# Patient Record
Sex: FEMALE | Race: WHITE | NOT HISPANIC OR LATINO | URBAN - METROPOLITAN AREA
[De-identification: names, ages, dates, MRNs, and addresses within clinical notes are randomized per-mention and may not be internally consistent; named-entity substitution may affect disease eponyms.]

---

## 2021-06-10 ENCOUNTER — HOSPITAL ENCOUNTER (OUTPATIENT)
Facility: HOSPITAL | Age: 26
End: 2021-06-10
Attending: OBSTETRICS & GYNECOLOGY | Admitting: OBSTETRICS & GYNECOLOGY

## 2021-06-10 ENCOUNTER — LAB (OUTPATIENT)
Dept: LAB | Facility: HOSPITAL | Age: 26
End: 2021-06-10

## 2021-06-10 LAB — GLUCOSE BLDC GLUCOMTR-MCNC: 335 MG/DL (ref 70–130)

## 2021-06-10 PROCEDURE — 82962 GLUCOSE BLOOD TEST: CPT

## 2023-06-13 ENCOUNTER — INITIAL PRENATAL (OUTPATIENT)
Dept: OBSTETRICS AND GYNECOLOGY | Facility: CLINIC | Age: 28
End: 2023-06-13
Payer: MEDICAID

## 2023-06-13 VITALS
DIASTOLIC BLOOD PRESSURE: 68 MMHG | WEIGHT: 125 LBS | BODY MASS INDEX: 19.62 KG/M2 | SYSTOLIC BLOOD PRESSURE: 122 MMHG | HEIGHT: 67 IN

## 2023-06-13 DIAGNOSIS — O36.80X0 ENCOUNTER TO DETERMINE FETAL VIABILITY OF PREGNANCY, SINGLE OR UNSPECIFIED FETUS: Primary | ICD-10-CM

## 2023-06-13 DIAGNOSIS — Z12.4 SCREENING FOR MALIGNANT NEOPLASM OF CERVIX: ICD-10-CM

## 2023-06-13 DIAGNOSIS — O09.30 LATE PRENATAL CARE: ICD-10-CM

## 2023-06-13 PROCEDURE — 99203 OFFICE O/P NEW LOW 30 MIN: CPT | Performed by: MIDWIFE

## 2023-06-13 NOTE — PROGRESS NOTES
"Subjective     Chief Complaint   Patient presents with    Initial Prenatal Visit     No Complaints/concerns  Patient still has nexplanon in left arm, inserted          Iza Mendieta is a 27 y.o. .  No LMP recorded. Patient is pregnant..  She presents to be seen to initiate prenatal care with our practice. She is currently incarcerated due to probation violation but hopes to get released next week. Her first pregnancy was  uncomplicated with . She had a Nexplanon placed  in left arm and it is still there.    The following portions of the patient's history were reviewed and updated as appropriate:vital signs, allergies, current medications, past medical history, past social history, past surgical history and problem list.    Review of Systems -   /68   Ht 170.2 cm (67\")   Wt 56.7 kg (125 lb)   BMI 19.58 kg/m²    Gastrointestinal: denies nausea and vomiting, denies constipation  Genitourinary: denies frequency, urgency, or burning with urination  All other systems were reviewed and are negative    Objective     Physical Exam  Constitutional   The patient is awake, alert, well developed, well nourished and well groomed.   Neck   The neck is supple and the trachea is midline. The thyroid is not enlarged and there are no palpable nodules.   Respiratory  The patient is relaxed and breathes without effort.   Lungs CTAB  Cardiovascular  Normal rate and rhythm is regular without murmur -  Negative LE pitting edema  Gastrointestinal   The abdomen is gravid - soft - non tender.  No umbilical hernia  Genitourinary   - External Genitalia without erythema, lesions, or masses  -Vagina - There is thick yellowish white vaginal discharge.   -Cervix is without cervical motion tenderness   Uterus - S=D, fundus at U  Musculoskeletal  Normal gait, no joint pain or swelling  Extremities  Full ROM. No cyanosis or edema  Nexplanon palpated left upper arm  Psychiatric  The patient is oriented to person, " place, and time. Maintains eye contact     Imaging   OB ultrasound  19 wks, normal anatomy US  US Ob 14 + Weeks Single or First Gestation (06/13/2023 11:05)     Assessment & Plan     ASSESSMENT  IUP at 19w0d  2.   Normal pregnancy  3.   Late entry to care  4.   Nexplanon in place    PLAN  Tests ordered today (option for genetic screening info given if appropriate):   Orders Placed This Encounter   Procedures    OB Panel With HIV     Order Specific Question:   Release to patient     Answer:   Routine Release    Urine Drug Screen - Urine, Clean Catch     Order Specific Question:   Release to patient     Answer:   Routine Release     Medications prescribed today:  No orders of the defined types were placed in this encounter.    Information reviewed:smoking in pregnancy, exercise in pregnancy, nutrition in pregnancy, weight gain in pregnancy, work and travel restrictions during pregnancy, list of OTC medications acceptable in pregnancy, and call coverage groups  Consulted with Dr Sargent regarding Nexplanon removal. She is considering having this replaced postpartum. Therefore, we can do removal and reinsertion postpartum.    Follow up: 4 week(s)         This note was electronically signed.    Suad Valdez CNM  June 13, 2023

## 2023-06-14 LAB
ABO GROUP BLD: ABNORMAL
AMPHETAMINES UR QL SCN: NEGATIVE NG/ML
BARBITURATES UR QL SCN: NEGATIVE NG/ML
BASOPHILS # BLD AUTO: 0 X10E3/UL (ref 0–0.2)
BASOPHILS NFR BLD AUTO: 0 %
BENZODIAZ UR QL SCN: NEGATIVE NG/ML
BLD GP AB SCN SERPL QL: NEGATIVE
BZE UR QL SCN: NEGATIVE NG/ML
CANNABINOIDS UR QL SCN: NEGATIVE NG/ML
CREAT UR-MCNC: 73.6 MG/DL (ref 20–300)
EOSINOPHIL # BLD AUTO: 0.1 X10E3/UL (ref 0–0.4)
EOSINOPHIL NFR BLD AUTO: 1 %
ERYTHROCYTE [DISTWIDTH] IN BLOOD BY AUTOMATED COUNT: 13 % (ref 11.7–15.4)
HBV SURFACE AG SERPL QL IA: NEGATIVE
HCT VFR BLD AUTO: 35.6 % (ref 34–46.6)
HCV IGG SERPL QL IA: NON REACTIVE
HGB BLD-MCNC: 12 G/DL (ref 11.1–15.9)
HIV 1+2 AB+HIV1 P24 AG SERPL QL IA: NON REACTIVE
IMM GRANULOCYTES # BLD AUTO: 0 X10E3/UL (ref 0–0.1)
IMM GRANULOCYTES NFR BLD AUTO: 0 %
LABORATORY COMMENT REPORT: NORMAL
LYMPHOCYTES # BLD AUTO: 1.4 X10E3/UL (ref 0.7–3.1)
LYMPHOCYTES NFR BLD AUTO: 17 %
MCH RBC QN AUTO: 31.9 PG (ref 26.6–33)
MCHC RBC AUTO-ENTMCNC: 33.7 G/DL (ref 31.5–35.7)
MCV RBC AUTO: 95 FL (ref 79–97)
METHADONE UR QL SCN: NEGATIVE NG/ML
MONOCYTES # BLD AUTO: 0.6 X10E3/UL (ref 0.1–0.9)
MONOCYTES NFR BLD AUTO: 7 %
NEUTROPHILS # BLD AUTO: 6.1 X10E3/UL (ref 1.4–7)
NEUTROPHILS NFR BLD AUTO: 75 %
OPIATES UR QL SCN: NEGATIVE NG/ML
OXYCODONE+OXYMORPHONE UR QL SCN: NEGATIVE NG/ML
PCP UR QL: NEGATIVE NG/ML
PH UR: 7.2 [PH] (ref 4.5–8.9)
PLATELET # BLD AUTO: 265 X10E3/UL (ref 150–450)
PROPOXYPH UR QL SCN: NEGATIVE NG/ML
RBC # BLD AUTO: 3.76 X10E6/UL (ref 3.77–5.28)
RH BLD: POSITIVE
RPR SER QL: NON REACTIVE
RUBV IGG SERPL IA-ACNC: 1.59 INDEX
WBC # BLD AUTO: 8.2 X10E3/UL (ref 3.4–10.8)

## 2023-06-15 LAB — REF LAB TEST METHOD: NORMAL

## 2023-07-31 ENCOUNTER — ROUTINE PRENATAL (OUTPATIENT)
Dept: OBSTETRICS AND GYNECOLOGY | Facility: CLINIC | Age: 28
End: 2023-07-31
Payer: MEDICAID

## 2023-07-31 VITALS — BODY MASS INDEX: 21.3 KG/M2 | DIASTOLIC BLOOD PRESSURE: 64 MMHG | SYSTOLIC BLOOD PRESSURE: 118 MMHG | WEIGHT: 136 LBS

## 2023-07-31 DIAGNOSIS — Z13.1 DIABETES MELLITUS SCREENING: ICD-10-CM

## 2023-07-31 DIAGNOSIS — O09.30 LATE PRENATAL CARE: ICD-10-CM

## 2023-07-31 DIAGNOSIS — N76.0 ACUTE VAGINITIS: Primary | ICD-10-CM

## 2023-07-31 DIAGNOSIS — Z34.92 PRENATAL CARE IN SECOND TRIMESTER: Primary | ICD-10-CM

## 2023-07-31 DIAGNOSIS — Z20.2 CHLAMYDIA CONTACT: ICD-10-CM

## 2023-07-31 LAB
BASOPHILS # BLD AUTO: 0.02 10*3/MM3 (ref 0–0.2)
BASOPHILS NFR BLD AUTO: 0.2 % (ref 0–1.5)
EOSINOPHIL # BLD AUTO: 0.13 10*3/MM3 (ref 0–0.4)
EOSINOPHIL NFR BLD AUTO: 1.3 % (ref 0.3–6.2)
ERYTHROCYTE [DISTWIDTH] IN BLOOD BY AUTOMATED COUNT: 11.7 % (ref 12.3–15.4)
GLUCOSE 1H P 50 G GLC PO SERPL-MCNC: 63 MG/DL (ref 65–139)
HCT VFR BLD AUTO: 32 % (ref 34–46.6)
HGB BLD-MCNC: 10.7 G/DL (ref 12–15.9)
IMM GRANULOCYTES # BLD AUTO: 0.03 10*3/MM3 (ref 0–0.05)
IMM GRANULOCYTES NFR BLD AUTO: 0.3 % (ref 0–0.5)
LYMPHOCYTES # BLD AUTO: 1.74 10*3/MM3 (ref 0.7–3.1)
LYMPHOCYTES NFR BLD AUTO: 17.3 % (ref 19.6–45.3)
MCH RBC QN AUTO: 30.8 PG (ref 26.6–33)
MCHC RBC AUTO-ENTMCNC: 33.4 G/DL (ref 31.5–35.7)
MCV RBC AUTO: 92.2 FL (ref 79–97)
MONOCYTES # BLD AUTO: 0.69 10*3/MM3 (ref 0.1–0.9)
MONOCYTES NFR BLD AUTO: 6.9 % (ref 5–12)
NEUTROPHILS # BLD AUTO: 7.45 10*3/MM3 (ref 1.7–7)
NEUTROPHILS NFR BLD AUTO: 74 % (ref 42.7–76)
NRBC BLD AUTO-RTO: 0 /100 WBC (ref 0–0.2)
PLATELET # BLD AUTO: 242 10*3/MM3 (ref 140–450)
RBC # BLD AUTO: 3.47 10*6/MM3 (ref 3.77–5.28)
WBC # BLD AUTO: 10.06 10*3/MM3 (ref 3.4–10.8)

## 2023-07-31 RX ORDER — AZITHROMYCIN 500 MG/1
1000 TABLET, FILM COATED ORAL ONCE
Qty: 2 TABLET | Refills: 0 | Status: SHIPPED | OUTPATIENT
Start: 2023-07-31 | End: 2023-08-01

## 2023-08-01 DIAGNOSIS — Z20.5 EXPOSURE TO HEPATITIS C: Primary | ICD-10-CM

## 2023-08-01 DIAGNOSIS — D50.9 IRON DEFICIENCY ANEMIA DURING PREGNANCY: Primary | ICD-10-CM

## 2023-08-01 DIAGNOSIS — O99.019 IRON DEFICIENCY ANEMIA DURING PREGNANCY: Primary | ICD-10-CM

## 2023-08-01 RX ORDER — FERROUS SULFATE 325(65) MG
325 TABLET ORAL
Qty: 60 TABLET | Refills: 6 | Status: SHIPPED | OUTPATIENT
Start: 2023-08-01

## 2023-08-03 DIAGNOSIS — O23.599 TRICHOMONAL VAGINITIS DURING PREGNANCY, ANTEPARTUM: ICD-10-CM

## 2023-08-03 DIAGNOSIS — O98.819 CHLAMYDIA INFECTION DURING PREGNANCY: Primary | ICD-10-CM

## 2023-08-03 DIAGNOSIS — A74.9 CHLAMYDIA INFECTION DURING PREGNANCY: Primary | ICD-10-CM

## 2023-08-03 DIAGNOSIS — A59.01 TRICHOMONAL VAGINITIS DURING PREGNANCY, ANTEPARTUM: ICD-10-CM

## 2023-08-03 DIAGNOSIS — B37.9 INFECTION DUE TO CANDIDA GLABRATA: ICD-10-CM

## 2023-08-03 LAB
A VAGINAE DNA VAG QL NAA+PROBE: ABNORMAL SCORE
BVAB2 DNA VAG QL NAA+PROBE: ABNORMAL SCORE
C ALBICANS DNA VAG QL NAA+PROBE: NEGATIVE
C GLABRATA DNA VAG QL NAA+PROBE: POSITIVE
C TRACH DNA VAG QL NAA+PROBE: POSITIVE
MEGA1 DNA VAG QL NAA+PROBE: ABNORMAL SCORE
N GONORRHOEA DNA VAG QL NAA+PROBE: NEGATIVE
T VAGINALIS DNA VAG QL NAA+PROBE: POSITIVE

## 2023-08-03 RX ORDER — AZITHROMYCIN 500 MG/1
1000 TABLET, FILM COATED ORAL ONCE
Qty: 2 TABLET | Refills: 0 | Status: SHIPPED | OUTPATIENT
Start: 2023-08-03 | End: 2023-08-04 | Stop reason: SDUPTHER

## 2023-08-03 RX ORDER — METRONIDAZOLE 500 MG/1
2000 TABLET ORAL ONCE
Qty: 4 TABLET | Refills: 0 | Status: SHIPPED | OUTPATIENT
Start: 2023-08-03 | End: 2023-08-04 | Stop reason: SDUPTHER

## 2023-08-04 ENCOUNTER — TELEPHONE (OUTPATIENT)
Dept: OBSTETRICS AND GYNECOLOGY | Facility: CLINIC | Age: 28
End: 2023-08-04

## 2023-08-04 DIAGNOSIS — O23.599 TRICHOMONAL VAGINITIS DURING PREGNANCY, ANTEPARTUM: ICD-10-CM

## 2023-08-04 DIAGNOSIS — B37.9 INFECTION DUE TO CANDIDA GLABRATA: ICD-10-CM

## 2023-08-04 DIAGNOSIS — A74.9 CHLAMYDIA INFECTION DURING PREGNANCY: ICD-10-CM

## 2023-08-04 DIAGNOSIS — O98.819 CHLAMYDIA INFECTION DURING PREGNANCY: ICD-10-CM

## 2023-08-04 DIAGNOSIS — A59.01 TRICHOMONAL VAGINITIS DURING PREGNANCY, ANTEPARTUM: ICD-10-CM

## 2023-08-04 NOTE — TELEPHONE ENCOUNTER
"    Caller: Iza Mendieta \"Renée\"    Relationship: Self    Best call back number: [phone]    What form or medical record are you requesting: PROOF OF PREGNANCY    Who is requesting this form or medical record from you: INSURANCE     How would you like to receive the form or medical records (pick-up, mail, fax): MYCHART IS POSSIBLE   If fax, what is the fax number:   If mail, what is the address:   If pick-up, provide patient with address and location details    Timeframe paperwork needed: BY 8/7/23    Additional notes: PT NEEDS PROOF OF PREGNANCY TO TURN INTO INSURANCE         "

## 2023-08-05 RX ORDER — METRONIDAZOLE 500 MG/1
2000 TABLET ORAL ONCE
Qty: 4 TABLET | Refills: 0 | Status: SHIPPED | OUTPATIENT
Start: 2023-08-05 | End: 2023-08-05

## 2023-08-05 RX ORDER — AZITHROMYCIN 500 MG/1
1000 TABLET, FILM COATED ORAL ONCE
Qty: 2 TABLET | Refills: 0 | Status: SHIPPED | OUTPATIENT
Start: 2023-08-05 | End: 2023-08-05

## 2023-08-14 ENCOUNTER — ROUTINE PRENATAL (OUTPATIENT)
Dept: OBSTETRICS AND GYNECOLOGY | Facility: CLINIC | Age: 28
End: 2023-08-14
Payer: MEDICAID

## 2023-08-14 VITALS — DIASTOLIC BLOOD PRESSURE: 62 MMHG | SYSTOLIC BLOOD PRESSURE: 100 MMHG | BODY MASS INDEX: 21.61 KG/M2 | WEIGHT: 138 LBS

## 2023-08-14 DIAGNOSIS — A59.01 TRICHOMONAL VAGINITIS DURING PREGNANCY IN SECOND TRIMESTER: Primary | ICD-10-CM

## 2023-08-14 DIAGNOSIS — O23.592 TRICHOMONAL VAGINITIS DURING PREGNANCY IN SECOND TRIMESTER: Primary | ICD-10-CM

## 2023-08-14 DIAGNOSIS — Z34.92 SECOND TRIMESTER PREGNANCY: ICD-10-CM

## 2023-08-14 PROCEDURE — 99213 OFFICE O/P EST LOW 20 MIN: CPT | Performed by: OBSTETRICS & GYNECOLOGY

## 2023-08-14 RX ORDER — PRENATAL WITH FERROUS FUM AND FOLIC ACID 3080; 920; 120; 400; 22; 1.84; 3; 20; 10; 1; 12; 200; 27; 25; 2 [IU]/1; [IU]/1; MG/1; [IU]/1; MG/1; MG/1; MG/1; MG/1; MG/1; MG/1; UG/1; MG/1; MG/1; MG/1; MG/1
1 TABLET ORAL DAILY
Qty: 90 TABLET | Refills: 12 | Status: SHIPPED | OUTPATIENT
Start: 2023-08-14 | End: 2026-10-27

## 2023-08-14 NOTE — PROGRESS NOTES
Chief Complaint   Patient presents with    Routine Prenatal Visit     Prenatal visit with no problems or concerns. Needs YESENIA done today        HPI:   , 27w6d gestation reports doing well    ROS:  See Prenatal Episode/Flowsheet  /62   Wt 62.6 kg (138 lb)   BMI 21.61 kg/mý      EXAM:  EXTREMITIES:  No swelling-See Prenatal Episode/Flowsheet    ABDOMEN:  FHTs/Movement noted-See Prenatal Episode/Flowsheet    URINE GLUCOSE/PROTEIN:  See Prenatal Episode/Flowsheet    PELVIC EXAM:  See Prenatal Episode/Flowsheet  CV:  Lungs:  GYN:    MDM:    Lab Results   Component Value Date    HGB 10.7 (L) 2023    RUBELLAABIGG 1.59 2023    HEPBSAG Negative 2023    ABO A 2023    RH Positive 2023    ABSCRN Negative 2023    PKB9EHL2 Non Reactive 2023    HEPCVIRUSABY Non Reactive 2023       U/S:US Ob 14 + Weeks Single or First Gestation (2023 11:05)     1. IUP 27w6d  2. Routine care   3. YESENIA done for +chlamydia and Trichomonas  4. HCV RNA +-- partner + and wants it done

## 2023-08-15 LAB
A VAGINAE DNA VAG QL NAA+PROBE: NORMAL SCORE
BVAB2 DNA VAG QL NAA+PROBE: NORMAL SCORE
C ALBICANS DNA VAG QL NAA+PROBE: NEGATIVE
C GLABRATA DNA VAG QL NAA+PROBE: NEGATIVE
C TRACH DNA VAG QL NAA+PROBE: NEGATIVE
MEGA1 DNA VAG QL NAA+PROBE: NORMAL SCORE
N GONORRHOEA DNA VAG QL NAA+PROBE: NEGATIVE
T VAGINALIS DNA VAG QL NAA+PROBE: NEGATIVE

## 2023-08-18 LAB
HCV AB SERPL QL IA: NORMAL
HCV IGG SERPL QL IA: NON REACTIVE

## 2023-09-05 ENCOUNTER — ROUTINE PRENATAL (OUTPATIENT)
Dept: OBSTETRICS AND GYNECOLOGY | Facility: CLINIC | Age: 28
End: 2023-09-05
Payer: MEDICAID

## 2023-09-05 VITALS — DIASTOLIC BLOOD PRESSURE: 62 MMHG | SYSTOLIC BLOOD PRESSURE: 104 MMHG | BODY MASS INDEX: 21.93 KG/M2 | WEIGHT: 140 LBS

## 2023-09-05 DIAGNOSIS — A74.9 CHLAMYDIA INFECTION AFFECTING PREGNANCY IN SECOND TRIMESTER: ICD-10-CM

## 2023-09-05 DIAGNOSIS — A59.01 TRICHOMONAL VAGINITIS DURING PREGNANCY IN SECOND TRIMESTER: ICD-10-CM

## 2023-09-05 DIAGNOSIS — O23.592 TRICHOMONAL VAGINITIS DURING PREGNANCY IN SECOND TRIMESTER: ICD-10-CM

## 2023-09-05 DIAGNOSIS — O09.30 LATE PRENATAL CARE: ICD-10-CM

## 2023-09-05 DIAGNOSIS — Z34.93 PRENATAL CARE IN THIRD TRIMESTER: Primary | ICD-10-CM

## 2023-09-05 DIAGNOSIS — O98.812 CHLAMYDIA INFECTION AFFECTING PREGNANCY IN SECOND TRIMESTER: ICD-10-CM

## 2023-09-05 NOTE — PROGRESS NOTES
Prenatal Care Visit    Subjective   Chief Complaint   Patient presents with    Routine Prenatal Visit     No complaints       History:   Iza is a  currently at 31w0d who presents for a prenatal care visit today.    No major issues.    Social History    Tobacco Use      Smoking status: Former        Types: Cigarettes      Smokeless tobacco: Former       Objective   /62   Wt 63.5 kg (140 lb)   BMI 21.93 kg/m²   Physical Exam:  Normal, gestational age-appropriate exam today        Plan   Medical Decision Making:    I have reviewed the prenatal labs and ultrasound(s) today. I have reviewed the most recent prenatal progress note(s).    Diagnosis: Supervision of high risk pregnancy  Recent incarceration  Late entry to prenatal care  Nexplanon removed during the second trimester  Chlamydia + Trich treated during pregnancy, YESENIA NEG ()   Tests/Orders/Rx today: No orders of the defined types were placed in this encounter.      Medication Management: None     Topics discussed: Prenatal care milestones  kick counts and fetal movement  PIH precautions   labor signs and symptoms   Tests next visit: U/S for EFW   Next visit: 2 week(s)     Gabriel Hargrove MD  Obstetrics and Gynecology  [facility]

## 2023-09-28 ENCOUNTER — ROUTINE PRENATAL (OUTPATIENT)
Dept: OBSTETRICS AND GYNECOLOGY | Facility: CLINIC | Age: 28
End: 2023-09-28
Payer: MEDICAID

## 2023-09-28 ENCOUNTER — HOSPITAL ENCOUNTER (OUTPATIENT)
Facility: HOSPITAL | Age: 28
Discharge: HOME | End: 2023-09-28
Attending: OBSTETRICS & GYNECOLOGY | Admitting: OBSTETRICS & GYNECOLOGY
Payer: MEDICAID

## 2023-09-28 VITALS
TEMPERATURE: 97.9 F | HEART RATE: 72 BPM | RESPIRATION RATE: 16 BRPM | WEIGHT: 145 LBS | DIASTOLIC BLOOD PRESSURE: 68 MMHG | BODY MASS INDEX: 22.76 KG/M2 | SYSTOLIC BLOOD PRESSURE: 115 MMHG | OXYGEN SATURATION: 94 % | HEIGHT: 67 IN

## 2023-09-28 VITALS — SYSTOLIC BLOOD PRESSURE: 110 MMHG | WEIGHT: 148 LBS | DIASTOLIC BLOOD PRESSURE: 62 MMHG | BODY MASS INDEX: 23.18 KG/M2

## 2023-09-28 DIAGNOSIS — A59.01 TRICHOMONAL VAGINITIS DURING PREGNANCY IN THIRD TRIMESTER: ICD-10-CM

## 2023-09-28 DIAGNOSIS — O98.813 CHLAMYDIA INFECTION AFFECTING PREGNANCY IN THIRD TRIMESTER: ICD-10-CM

## 2023-09-28 DIAGNOSIS — A74.9 CHLAMYDIA INFECTION AFFECTING PREGNANCY IN THIRD TRIMESTER: ICD-10-CM

## 2023-09-28 DIAGNOSIS — O09.30 LATE PRENATAL CARE: Primary | ICD-10-CM

## 2023-09-28 DIAGNOSIS — O47.00 PRETERM CONTRACTIONS: ICD-10-CM

## 2023-09-28 DIAGNOSIS — O23.593 TRICHOMONAL VAGINITIS DURING PREGNANCY IN THIRD TRIMESTER: ICD-10-CM

## 2023-09-28 DIAGNOSIS — Z36.89 ENCOUNTER FOR ULTRASOUND TO ASSESS FETAL GROWTH: ICD-10-CM

## 2023-09-28 LAB
ALBUMIN SERPL-MCNC: 3.6 G/DL (ref 3.5–5.2)
ALBUMIN/GLOB SERPL: 1.4 G/DL
ALP SERPL-CCNC: 73 U/L (ref 39–117)
ALT SERPL W P-5'-P-CCNC: 19 U/L (ref 1–33)
AMPHET+METHAMPHET UR QL: NEGATIVE
AMPHETAMINES UR QL: NEGATIVE
ANION GAP SERPL CALCULATED.3IONS-SCNC: 13 MMOL/L (ref 5–15)
AST SERPL-CCNC: 19 U/L (ref 1–32)
BARBITURATES UR QL SCN: NEGATIVE
BENZODIAZ UR QL SCN: NEGATIVE
BILIRUB BLD-MCNC: NEGATIVE MG/DL
BILIRUB SERPL-MCNC: <0.2 MG/DL (ref 0–1.2)
BUN SERPL-MCNC: 10 MG/DL (ref 6–20)
BUN/CREAT SERPL: 16.1 (ref 7–25)
BUPRENORPHINE SERPL-MCNC: NEGATIVE NG/ML
CALCIUM SPEC-SCNC: 8.6 MG/DL (ref 8.6–10.5)
CANNABINOIDS SERPL QL: NEGATIVE
CHLORIDE SERPL-SCNC: 104 MMOL/L (ref 98–107)
CLARITY, POC: ABNORMAL
CO2 SERPL-SCNC: 18 MMOL/L (ref 22–29)
COCAINE UR QL: NEGATIVE
COLOR UR: ABNORMAL
CREAT SERPL-MCNC: 0.62 MG/DL (ref 0.57–1)
CREAT UR-MCNC: 279.4 MG/DL
DEPRECATED RDW RBC AUTO: 43.6 FL (ref 37–54)
EGFRCR SERPLBLD CKD-EPI 2021: 123.8 ML/MIN/1.73
ERYTHROCYTE [DISTWIDTH] IN BLOOD BY AUTOMATED COUNT: 12.9 % (ref 12.3–15.4)
FENTANYL UR-MCNC: NEGATIVE NG/ML
GLOBULIN UR ELPH-MCNC: 2.5 GM/DL
GLUCOSE SERPL-MCNC: 82 MG/DL (ref 65–99)
GLUCOSE UR STRIP-MCNC: NEGATIVE MG/DL
HCT VFR BLD AUTO: 31.9 % (ref 34–46.6)
HGB BLD-MCNC: 10.8 G/DL (ref 12–15.9)
KETONES UR QL: NEGATIVE
LEUKOCYTE EST, POC: NEGATIVE
MCH RBC QN AUTO: 31.4 PG (ref 26.6–33)
MCHC RBC AUTO-ENTMCNC: 33.9 G/DL (ref 31.5–35.7)
MCV RBC AUTO: 92.7 FL (ref 79–97)
METHADONE UR QL SCN: NEGATIVE
NITRITE UR-MCNC: NEGATIVE MG/ML
OPIATES UR QL: NEGATIVE
OXYCODONE UR QL SCN: NEGATIVE
PCP UR QL SCN: NEGATIVE
PH UR: 6 [PH] (ref 5–8)
PLATELET # BLD AUTO: 264 10*3/MM3 (ref 140–450)
PMV BLD AUTO: 9.7 FL (ref 6–12)
POTASSIUM SERPL-SCNC: 3.6 MMOL/L (ref 3.5–5.2)
PROPOXYPH UR QL: NEGATIVE
PROT ?TM UR-MCNC: 29 MG/DL
PROT SERPL-MCNC: 6.1 G/DL (ref 6–8.5)
PROT UR STRIP-MCNC: ABNORMAL MG/DL
PROT/CREAT UR: 103.8 MG/G CREA (ref 0–200)
RBC # BLD AUTO: 3.44 10*6/MM3 (ref 3.77–5.28)
RBC # UR STRIP: NEGATIVE /UL
SODIUM SERPL-SCNC: 135 MMOL/L (ref 136–145)
SP GR UR: 1 (ref 1–1.03)
TRICYCLICS UR QL SCN: NEGATIVE
UROBILINOGEN UR QL: NORMAL
WBC NRBC COR # BLD: 7.8 10*3/MM3 (ref 3.4–10.8)

## 2023-09-28 PROCEDURE — G0463 HOSPITAL OUTPT CLINIC VISIT: HCPCS

## 2023-09-28 PROCEDURE — 85027 COMPLETE CBC AUTOMATED: CPT | Performed by: MIDWIFE

## 2023-09-28 PROCEDURE — 80053 COMPREHEN METABOLIC PANEL: CPT | Performed by: MIDWIFE

## 2023-09-28 PROCEDURE — 59025 FETAL NON-STRESS TEST: CPT

## 2023-09-28 PROCEDURE — 36415 COLL VENOUS BLD VENIPUNCTURE: CPT | Performed by: MIDWIFE

## 2023-09-28 PROCEDURE — 84156 ASSAY OF PROTEIN URINE: CPT | Performed by: MIDWIFE

## 2023-09-28 PROCEDURE — 82570 ASSAY OF URINE CREATININE: CPT | Performed by: MIDWIFE

## 2023-09-28 PROCEDURE — 59025 FETAL NON-STRESS TEST: CPT | Performed by: MIDWIFE

## 2023-09-28 PROCEDURE — 81002 URINALYSIS NONAUTO W/O SCOPE: CPT | Performed by: MIDWIFE

## 2023-09-28 PROCEDURE — 80307 DRUG TEST PRSMV CHEM ANLYZR: CPT | Performed by: MIDWIFE

## 2023-09-28 RX ORDER — SODIUM CHLORIDE 0.9 % (FLUSH) 0.9 %
10 SYRINGE (ML) INJECTION EVERY 12 HOURS SCHEDULED
Status: DISCONTINUED | OUTPATIENT
Start: 2023-09-28 | End: 2023-09-28 | Stop reason: HOSPADM

## 2023-09-28 RX ORDER — SODIUM CHLORIDE 0.9 % (FLUSH) 0.9 %
10 SYRINGE (ML) INJECTION AS NEEDED
Status: DISCONTINUED | OUTPATIENT
Start: 2023-09-28 | End: 2023-09-28 | Stop reason: HOSPADM

## 2023-09-28 RX ORDER — SODIUM CHLORIDE 9 MG/ML
40 INJECTION, SOLUTION INTRAVENOUS AS NEEDED
Status: DISCONTINUED | OUTPATIENT
Start: 2023-09-28 | End: 2023-09-28 | Stop reason: HOSPADM

## 2023-09-28 RX ORDER — ACETAMINOPHEN 325 MG/1
650 TABLET ORAL ONCE
Status: DISCONTINUED | OUTPATIENT
Start: 2023-09-28 | End: 2023-09-28 | Stop reason: HOSPADM

## 2023-09-28 RX ORDER — LIDOCAINE HYDROCHLORIDE 10 MG/ML
0.5 INJECTION, SOLUTION INFILTRATION; PERINEURAL ONCE AS NEEDED
Status: DISCONTINUED | OUTPATIENT
Start: 2023-09-28 | End: 2023-09-28 | Stop reason: HOSPADM

## 2023-09-28 RX ORDER — SODIUM CHLORIDE, SODIUM LACTATE, POTASSIUM CHLORIDE, CALCIUM CHLORIDE 600; 310; 30; 20 MG/100ML; MG/100ML; MG/100ML; MG/100ML
1000 INJECTION, SOLUTION INTRAVENOUS ONCE
Status: COMPLETED | OUTPATIENT
Start: 2023-09-28 | End: 2023-09-28

## 2023-09-28 RX ORDER — SODIUM CHLORIDE, SODIUM LACTATE, POTASSIUM CHLORIDE, CALCIUM CHLORIDE 600; 310; 30; 20 MG/100ML; MG/100ML; MG/100ML; MG/100ML
150 INJECTION, SOLUTION INTRAVENOUS CONTINUOUS
Status: DISCONTINUED | OUTPATIENT
Start: 2023-09-28 | End: 2023-09-28 | Stop reason: HOSPADM

## 2023-09-28 RX ADMIN — SODIUM CHLORIDE, POTASSIUM CHLORIDE, SODIUM LACTATE AND CALCIUM CHLORIDE 150 ML/HR: 600; 310; 30; 20 INJECTION, SOLUTION INTRAVENOUS at 09:40

## 2023-09-28 RX ADMIN — SODIUM CHLORIDE, POTASSIUM CHLORIDE, SODIUM LACTATE AND CALCIUM CHLORIDE 1000 ML/HR: 600; 310; 30; 20 INJECTION, SOLUTION INTRAVENOUS at 08:46

## 2023-09-28 NOTE — PROGRESS NOTES
Chief Complaint   Patient presents with    Routine Prenatal Visit     Prenatal visit with Growth scan and cervical length done today. Still having some contractions        HPI:   , 34w2d gestation reports doing well    ROS:  See Prenatal Episode/Flowsheet  /62   Wt 67.1 kg (148 lb)   BMI 23.18 kg/m²      EXAM:  EXTREMITIES:  No swelling-See Prenatal Episode/Flowsheet    ABDOMEN:  FHTs/Movement noted-See Prenatal Episode/Flowsheet    URINE GLUCOSE/PROTEIN:  See Prenatal Episode/Flowsheet    PELVIC EXAM:  See Prenatal Episode/Flowsheet  CV:  Lungs:  GYN:    MDM:    Lab Results   Component Value Date    HGB 10.8 (L) 2023    RUBELLAABIGG 1.59 2023    HEPBSAG Negative 2023    ABO A 2023    RH Positive 2023    ABSCRN Negative 2023    FNK2NHC1 Non Reactive 2023    HEPCVIRUSABY Non Reactive 2023       U/S: Overall growth 35 percentile.  Symmetric.  NAYANA 19.4.  Cervical length 3 cm.  Vertex.  Posterior placenta.    1. IUP 34w2d  2. Routine care   3.   uterine contractions.  Was observed with no cervical change this morning.  Cervical length is reassuring.   Prior term 39 weeks 10 years ago 7#3  4. Anemia: taking FE and PNV

## 2023-09-28 NOTE — NON STRESS TEST
Triage Note - Nursing Documentation  Labor and Delivery Admission Log    Iza Mendieta  : 1995  MRN: [id number]  CSN: [id number]    Date in / Time in:  2023  Time in: 706    Date out / Time out:    Time out: 1245    Nurse: Chelsea Yen RN    Patient Info: She is a 28 y.o. year old  at 34w2d with an MEREDITH of 2023, by Ultrasound who was seen on the [facility].    Chief Complaint:   Chief Complaint   Patient presents with    Abdominal Pain     Upper epigastric pain began this am 0100 per pt       Provider Instructions / Disposition: Patient arrived vis ambulance for labor check. Patient having contractions and pain. Patient IV started with fluids. Patient had vaginal exam performed by DON Rodriguez. Patient now has few contractions and will be going to her office appointment to get US and to see Dr. Sargent. Patient given education on signs and symptoms of labor, fetal kick counts, and risks for  labor.     Patient Active Problem List   Diagnosis    Late prenatal care    Chlamydia infection affecting pregnancy    Trichomonal vaginitis during pregnancy       NST Documentation (Only applicable > 32 weeks): Interpretation A  Nonstress Test Interpretation A: Reactive (23 1100 : Chelsea Yen, RN)

## 2023-09-28 NOTE — H&P
: 1995  MRN: [id number]  CSN: [id number]    History and Physical    Chief Complaint   Patient presents with    Abdominal Pain     Upper epigastric pain began this am 0100 per pt      Iza Mendieta is a 28 y.o. year old  with an Estimated Date of Delivery: 23 currently at 34w2d presenting with  RUQ abdominal pain which radiates down and around to front of abdomen .  Her symptoms started about 0200. She denies any vaginal bleeding or leakage of fluid. Baby is active.    She has received prenatal care with [facility]. It has been complicated by late entry to care, incarceration early pregnancy, and Nexplanon removal during second trimester.  No history of  labor with first pregnancy.      OB History    Para Term  AB Living   2 1 1 0 0 1   SAB IAB Ectopic Molar Multiple Live Births   0 0 0 0 0 1      # Outcome Date GA Lbr Federico/2nd Weight Sex Delivery Anes PTL Lv   2 Current            1 Term 13 39w0d  3260 g (7 lb 3 oz) M Vag-Spont EPI  RAKESH     Past Medical History:   Diagnosis Date    Chlamydia     Urogenital trichomoniasis      Past Surgical History:   Procedure Laterality Date    NO PAST SURGERIES         Current Facility-Administered Medications:     lactated ringers infusion, 150 mL/hr, Intravenous, Continuous, José Miguel, Suad A, CNM    lidocaine (XYLOCAINE) 1 % injection 0.5 mL, 0.5 mL, Intradermal, Once PRN, José Miguel, Suad A, CNM    sodium chloride 0.9 % flush 10 mL, 10 mL, Intravenous, Q12H, Foster, Suad A, CNM    sodium chloride 0.9 % flush 10 mL, 10 mL, Intravenous, PRN, Foster, Suad A, CNM    sodium chloride 0.9 % infusion 40 mL, 40 mL, Intravenous, PRN, Foster, Suad A, CNM    Allergies   Allergen Reactions    Amoxicillin Anaphylaxis    Penicillins Anaphylaxis       Review of Systems     Respiratory ROS: no cough, shortness of breath, or wheezing  Cardiovascular ROS: no chest pain or dyspnea on exertion  Gastrointestinal ROS: no abdominal  "pain, change in bowel habits, or black or bloody stools  Genito-Urinary ROS: no dysuria, trouble voiding, or hematuria        Objective   BP 95/54   Pulse 70   Temp 97.9 °F (36.6 °C) (Oral)   Resp 16   Ht 170.2 cm (67\")   Wt 65.8 kg (145 lb)   SpO2 100%   BMI 22.71 kg/m²     Physical Exam: General Appearance: alert, appears stated age, and cooperative  Lungs: respirations regular, respirations even, and respirations unlabored  Abdomen: soft non-tender, no guarding, and uterus gravid and nontender  Extremities: moves extremities well, no edema, no cyanosis, and no redness  Skin: no bleeding, bruising or rash and no lesions noted  Neurologic: Mental Status orientated to person, place, time and situation, Speech normal content and execusion       FHT's: reassuring, reactive, and category 1      Cervix: was checked (by me): .5 cm / 50 % / -3   Presentation: cephalic   Contractions: irregular - external monitors used         Prenatal Labs  Lab Results   Component Value Date    HGB 10.8 (L) 09/28/2023    HEPBSAG Negative 06/13/2023    ABSCRN Negative 06/13/2023    GVO2IXF1 Non Reactive 06/13/2023    HEPCVIRUSABY Non Reactive 06/13/2023       Current Labs Reviewed   Lab Results (last 24 hours)       Procedure Component Value Units Date/Time    CBC (No Diff) [[id number]]  (Abnormal) Collected: 09/28/23 0819    Specimen: Blood Updated: 09/28/23 0842     WBC 7.80 10*3/mm3      RBC 3.44 10*6/mm3      Hemoglobin 10.8 g/dL      Hematocrit 31.9 %      MCV 92.7 fL      MCH 31.4 pg      MCHC 33.9 g/dL      RDW 12.9 %      RDW-SD 43.6 fl      MPV 9.7 fL      Platelets 264 10*3/mm3     Comprehensive Metabolic Panel [[id number]] Collected: 09/28/23 0819    Specimen: Blood Updated: 09/28/23 0838    Protein / Creatinine Ratio, Urine - Urine, Clean Catch [[id number]] Collected: 09/28/23 0801    Specimen: Urine, Clean Catch Updated: 09/28/23 0838    POC Urinalysis Dipstick [[id number]]  (Abnormal) Collected: 09/28/23 0726    Specimen: " Urine, Clean Catch Updated: 09/28/23 0727     Color Orange     Clarity, UA Cloudy     Glucose, UA Negative mg/dL      Bilirubin Negative     Ketones, UA Negative     Specific Gravity  1.000     Blood, UA Negative     pH, Urine 6.0     Protein, POC 1+ mg/dL      Urobilinogen, UA Normal     Leukocytes Negative     Nitrite, UA Negative                 Assessment   IUP at 34w2d  RUQ abdominal pain           Plan   EFM  IVF bolus and then @ 150/hr     Consulted with Dr Sargent for POC  Keep scheduled followup today @ 1 pm    New Medications Ordered This Visit   Medications    sodium chloride 0.9 % flush 10 mL    sodium chloride 0.9 % flush 10 mL    sodium chloride 0.9 % infusion 40 mL    lidocaine (XYLOCAINE) 1 % injection 0.5 mL    lactated ringers infusion    lactated ringers infusion       Suad Valdez CNM  9/28/2023  09:05 EDT

## 2023-10-05 ENCOUNTER — ROUTINE PRENATAL (OUTPATIENT)
Dept: OBSTETRICS AND GYNECOLOGY | Facility: CLINIC | Age: 28
End: 2023-10-05
Payer: MEDICAID

## 2023-10-05 VITALS — WEIGHT: 149 LBS | SYSTOLIC BLOOD PRESSURE: 100 MMHG | DIASTOLIC BLOOD PRESSURE: 62 MMHG | BODY MASS INDEX: 23.34 KG/M2

## 2023-10-05 DIAGNOSIS — Z34.93 THIRD TRIMESTER PREGNANCY: Primary | ICD-10-CM

## 2023-10-05 NOTE — PROGRESS NOTES
Chief Complaint   Patient presents with    Routine Prenatal Visit     Prenatal visit with no problems or concerns        HPI:   , 35w2d gestation reports doing well    ROS:  See Prenatal Episode/Flowsheet  /62   Wt 67.6 kg (149 lb)   BMI 23.34 kg/m²      EXAM:  EXTREMITIES:  No swelling-See Prenatal Episode/Flowsheet    ABDOMEN:  FHTs/Movement noted-See Prenatal Episode/Flowsheet    URINE GLUCOSE/PROTEIN:  See Prenatal Episode/Flowsheet    PELVIC EXAM:  See Prenatal Episode/Flowsheet  CV:  Lungs:  GYN:    MDM:    Lab Results   Component Value Date    HGB 10.8 (L) 2023    RUBELLAABIGG 1.59 2023    HEPBSAG Negative 2023    ABO A 2023    RH Positive 2023    ABSCRN Negative 2023    [id number] Non Reactive 2023    HEPCVIRUSABY Non Reactive 2023       U/S:US Ob Follow Up Transabdominal Approach (2023 13:30)     1. IUP 35w2d  2. Routine care   3. Anemia: taking Fe and PNV  4. GBS next time and wants repeat STD testing

## 2023-10-19 ENCOUNTER — ROUTINE PRENATAL (OUTPATIENT)
Dept: OBSTETRICS AND GYNECOLOGY | Facility: CLINIC | Age: 28
End: 2023-10-19
Payer: MEDICAID

## 2023-10-19 VITALS — SYSTOLIC BLOOD PRESSURE: 100 MMHG | DIASTOLIC BLOOD PRESSURE: 60 MMHG | BODY MASS INDEX: 23.31 KG/M2 | WEIGHT: 148.8 LBS

## 2023-10-19 DIAGNOSIS — O09.30 LATE PRENATAL CARE: ICD-10-CM

## 2023-10-19 DIAGNOSIS — Z36.85 ENCOUNTER FOR ANTENATAL SCREENING FOR STREPTOCOCCUS B: ICD-10-CM

## 2023-10-19 DIAGNOSIS — O99.019 MATERNAL ANEMIA IN PREGNANCY, ANTEPARTUM: ICD-10-CM

## 2023-10-19 DIAGNOSIS — O98.319 CHLAMYDIA TRACHOMATIS INFECTION IN MOTHER DURING PREGNANCY, ANTEPARTUM: ICD-10-CM

## 2023-10-19 DIAGNOSIS — O23.599 TRICHOMONAL VAGINITIS DURING PREGNANCY, ANTEPARTUM: ICD-10-CM

## 2023-10-19 DIAGNOSIS — Z20.5 EXPOSURE TO HEPATITIS C: ICD-10-CM

## 2023-10-19 DIAGNOSIS — A59.01 TRICHOMONAL VAGINITIS DURING PREGNANCY, ANTEPARTUM: ICD-10-CM

## 2023-10-19 DIAGNOSIS — A56.8 CHLAMYDIA TRACHOMATIS INFECTION IN MOTHER DURING PREGNANCY, ANTEPARTUM: ICD-10-CM

## 2023-10-19 DIAGNOSIS — Z34.93 PRENATAL CARE IN THIRD TRIMESTER: Primary | ICD-10-CM

## 2023-10-19 NOTE — PROGRESS NOTES
Caller: IGNACIO BRENNER    Relationship: Mother    Best call back number: [phone]    What is the best time to reach you:   ANYTIME    Who are you requesting to speak with (clinical staff, provider,  specific staff member):   CLINICAL STAFF    Do you know the name of the person who called:   IGNACIO BRENNER    What was the call regarding:   PATIENT MOTHER CALLED AND WANTED TO KNOW IF Rx CAN BE CHANGED. ADVISED THAT PATIENT IS BECOMING MORE DEFIANT AT SCHOOL AND WANTED TO INQUIRE ON WHAT TO DO.     Do you require a callback:   YES       Prenatal Care Visit    Subjective   Chief Complaint   Patient presents with    Routine Prenatal Visit     Prenatal visit with GBS done today. No problems or concerns     History:   Iza is a  currently at 37w2d who presents for a prenatal care visit today.    Reports some CTX recently but nothing regular. Denies VB, LOF. Reports (+) FM.     Objective   /60   Wt 67.5 kg (148 lb 12.8 oz)   BMI 23.31 kg/m²   Physical Exam:  Normal, gestational age-appropriate exam today      Assessment & Plan     IUP @ 37w2d  Routine care: I have reviewed the prenatal labs and ultrasound(s) today. I have reviewed the most recent prenatal progress note(s). GBS today.  Recent incarceration  Late entry to care  H/o CZ and TV during pregnancy: s/p treatment with (-) YESENIA 23. Repeat screen today in anticipation of delivery.  Exposure to HCV: partner with (+) HCV. Patient with negative screening x 2.  Maternal anemia: continue iron supplement     Diagnosis Plan   1. Prenatal care in third trimester        2. Encounter for  screening for Streptococcus B  Strep Grp B BLOSSOM + Reflex - Swab, Vaginal/Rectum      3. Late prenatal care        4. Chlamydia trachomatis infection in mother during pregnancy, antepartum  NuSwab VG+ - Swab, Vagina      5. Trichomonal vaginitis during pregnancy, antepartum  NuSwab VG+ - Swab, Vagina      6. Exposure to hepatitis C        7. Maternal anemia in pregnancy, antepartum           Medication Management: continue PNV, iron    Topics discussed: Prenatal care milestones  Iron supplementation  Kick counts and fetal movement  Labor signs and symptoms  Birth plan  Induction of labor   Tests next visit: none   Next visit: 1 week(s)     Hilary Sun MD  Obstetrics and Gynecology  [facility]

## 2023-10-21 LAB — GP B STREP DNA SPEC QL NAA+PROBE: NEGATIVE

## 2023-10-26 ENCOUNTER — PREP FOR SURGERY (OUTPATIENT)
Dept: OTHER | Facility: HOSPITAL | Age: 28
End: 2023-10-26
Payer: MEDICAID

## 2023-10-26 ENCOUNTER — ROUTINE PRENATAL (OUTPATIENT)
Dept: OBSTETRICS AND GYNECOLOGY | Facility: CLINIC | Age: 28
End: 2023-10-26
Payer: MEDICAID

## 2023-10-26 VITALS — BODY MASS INDEX: 23.49 KG/M2 | SYSTOLIC BLOOD PRESSURE: 102 MMHG | WEIGHT: 150 LBS | DIASTOLIC BLOOD PRESSURE: 62 MMHG

## 2023-10-26 DIAGNOSIS — Z34.90 ENCOUNTER FOR INDUCTION OF LABOR: Primary | ICD-10-CM

## 2023-10-26 DIAGNOSIS — O98.813 CHLAMYDIA INFECTION AFFECTING PREGNANCY IN THIRD TRIMESTER: ICD-10-CM

## 2023-10-26 DIAGNOSIS — A59.01 TRICHOMONAL VAGINITIS DURING PREGNANCY IN THIRD TRIMESTER: ICD-10-CM

## 2023-10-26 DIAGNOSIS — Z34.93 PRENATAL CARE IN THIRD TRIMESTER: Primary | ICD-10-CM

## 2023-10-26 DIAGNOSIS — O09.30 LATE PRENATAL CARE: ICD-10-CM

## 2023-10-26 DIAGNOSIS — A74.9 CHLAMYDIA INFECTION AFFECTING PREGNANCY IN THIRD TRIMESTER: ICD-10-CM

## 2023-10-26 DIAGNOSIS — O23.593 TRICHOMONAL VAGINITIS DURING PREGNANCY IN THIRD TRIMESTER: ICD-10-CM

## 2023-10-26 RX ORDER — SODIUM CHLORIDE 0.9 % (FLUSH) 0.9 %
10 SYRINGE (ML) INJECTION AS NEEDED
OUTPATIENT
Start: 2023-10-26

## 2023-10-26 RX ORDER — HYDROCODONE BITARTRATE AND ACETAMINOPHEN 5; 325 MG/1; MG/1
1 TABLET ORAL EVERY 4 HOURS PRN
OUTPATIENT
Start: 2023-10-26 | End: 2023-11-02

## 2023-10-26 RX ORDER — ONDANSETRON 4 MG/1
4 TABLET, FILM COATED ORAL EVERY 6 HOURS PRN
OUTPATIENT
Start: 2023-10-26

## 2023-10-26 RX ORDER — OXYTOCIN/0.9 % SODIUM CHLORIDE 30/500 ML
999 PLASTIC BAG, INJECTION (ML) INTRAVENOUS ONCE
OUTPATIENT
Start: 2023-10-26 | End: 2023-10-26

## 2023-10-26 RX ORDER — MORPHINE SULFATE 2 MG/ML
2 INJECTION, SOLUTION INTRAMUSCULAR; INTRAVENOUS ONCE AS NEEDED
OUTPATIENT
Start: 2023-10-26

## 2023-10-26 RX ORDER — ONDANSETRON 2 MG/ML
4 INJECTION INTRAMUSCULAR; INTRAVENOUS EVERY 6 HOURS PRN
OUTPATIENT
Start: 2023-10-26

## 2023-10-26 RX ORDER — SODIUM CHLORIDE, SODIUM LACTATE, POTASSIUM CHLORIDE, CALCIUM CHLORIDE 600; 310; 30; 20 MG/100ML; MG/100ML; MG/100ML; MG/100ML
125 INJECTION, SOLUTION INTRAVENOUS CONTINUOUS
OUTPATIENT
Start: 2023-10-26

## 2023-10-26 RX ORDER — SODIUM CHLORIDE 0.9 % (FLUSH) 0.9 %
10 SYRINGE (ML) INJECTION EVERY 12 HOURS SCHEDULED
OUTPATIENT
Start: 2023-10-26

## 2023-10-26 RX ORDER — CARBOPROST TROMETHAMINE 250 UG/ML
250 INJECTION, SOLUTION INTRAMUSCULAR ONCE AS NEEDED
OUTPATIENT
Start: 2023-10-26 | End: 2023-10-27

## 2023-10-26 RX ORDER — SODIUM CHLORIDE 9 MG/ML
40 INJECTION, SOLUTION INTRAVENOUS AS NEEDED
OUTPATIENT
Start: 2023-10-26

## 2023-10-26 RX ORDER — OXYTOCIN/0.9 % SODIUM CHLORIDE 30/500 ML
250 PLASTIC BAG, INJECTION (ML) INTRAVENOUS CONTINUOUS
OUTPATIENT
Start: 2023-10-26 | End: 2023-10-26

## 2023-10-26 RX ORDER — ACETAMINOPHEN 325 MG/1
650 TABLET ORAL ONCE AS NEEDED
OUTPATIENT
Start: 2023-10-26

## 2023-10-26 RX ORDER — LIDOCAINE HYDROCHLORIDE 10 MG/ML
0.5 INJECTION, SOLUTION INFILTRATION; PERINEURAL ONCE AS NEEDED
OUTPATIENT
Start: 2023-10-26

## 2023-10-26 RX ORDER — METHYLERGONOVINE MALEATE 0.2 MG/ML
200 INJECTION INTRAVENOUS ONCE AS NEEDED
OUTPATIENT
Start: 2023-10-26 | End: 2023-10-27

## 2023-10-26 RX ORDER — ACETAMINOPHEN 325 MG/1
650 TABLET ORAL EVERY 4 HOURS PRN
OUTPATIENT
Start: 2023-10-26

## 2023-10-26 RX ORDER — PROMETHAZINE HYDROCHLORIDE 12.5 MG/1
12.5 SUPPOSITORY RECTAL EVERY 6 HOURS PRN
OUTPATIENT
Start: 2023-10-26

## 2023-10-26 RX ORDER — ONDANSETRON 2 MG/ML
4 INJECTION INTRAMUSCULAR; INTRAVENOUS ONCE AS NEEDED
OUTPATIENT
Start: 2023-10-26

## 2023-10-26 RX ORDER — OXYTOCIN/0.9 % SODIUM CHLORIDE 30/500 ML
1-20 PLASTIC BAG, INJECTION (ML) INTRAVENOUS
OUTPATIENT
Start: 2023-10-26

## 2023-10-26 RX ORDER — PROMETHAZINE HYDROCHLORIDE 12.5 MG/1
12.5 TABLET ORAL EVERY 6 HOURS PRN
OUTPATIENT
Start: 2023-10-26

## 2023-10-26 RX ORDER — HYDROXYZINE HYDROCHLORIDE 25 MG/1
50 TABLET, FILM COATED ORAL NIGHTLY PRN
OUTPATIENT
Start: 2023-10-26

## 2023-10-26 RX ORDER — MISOPROSTOL 200 UG/1
800 TABLET ORAL ONCE AS NEEDED
OUTPATIENT
Start: 2023-10-26 | End: 2023-10-27

## 2023-10-26 RX ORDER — ONDANSETRON 4 MG/1
4 TABLET, FILM COATED ORAL ONCE AS NEEDED
OUTPATIENT
Start: 2023-10-26

## 2023-10-30 NOTE — PROGRESS NOTES
Prenatal Care Visit    Subjective   Chief Complaint   Patient presents with    Routine Prenatal Visit     No complaints       History:   Iza is a  currently at 38w2d who presents for a prenatal care visit today.    No major issues.    Social History    Tobacco Use      Smoking status: Former        Types: Cigarettes      Smokeless tobacco: Former       Objective   /62   Wt 68 kg (150 lb)   BMI 23.49 kg/m²   Physical Exam:  Normal, gestational age-appropriate exam today        Plan   Medical Decision Making:    I have reviewed the prenatal labs and ultrasound(s) today. I have reviewed the most recent prenatal progress note(s).    Diagnosis: Supervision of high risk pregnancy  Recent incarceration  Late entry to prenatal care  Nexplanon removed during the second trimester  Chlamydia + Trich treated during pregnancy, YESENIA NEG ()   Tests/Orders/Rx today: No orders of the defined types were placed in this encounter.      Medication Management: None     Topics discussed: Prenatal care milestones  induction of labor  kick counts and fetal movement  labor signs and symptoms  PIH precautions   Tests next visit: none   Next visit: 1 week(s)     Gabriel Hargrove MD  Obstetrics and Gynecology  [facility]

## 2023-10-31 ENCOUNTER — ANESTHESIA EVENT (OUTPATIENT)
Dept: LABOR AND DELIVERY | Facility: HOSPITAL | Age: 28
End: 2023-10-31
Payer: MEDICAID

## 2023-10-31 ENCOUNTER — ANESTHESIA (OUTPATIENT)
Dept: LABOR AND DELIVERY | Facility: HOSPITAL | Age: 28
End: 2023-10-31
Payer: MEDICAID

## 2023-10-31 ENCOUNTER — HOSPITAL ENCOUNTER (INPATIENT)
Facility: HOSPITAL | Age: 28
LOS: 3 days | Discharge: HOME | End: 2023-11-03
Attending: MIDWIFE | Admitting: OBSTETRICS & GYNECOLOGY
Payer: MEDICAID

## 2023-10-31 ENCOUNTER — HOSPITAL ENCOUNTER (OUTPATIENT)
Dept: LABOR AND DELIVERY | Facility: HOSPITAL | Age: 28
Discharge: HOME | End: 2023-10-31
Payer: MEDICAID

## 2023-10-31 DIAGNOSIS — Z34.90 ENCOUNTER FOR INDUCTION OF LABOR: ICD-10-CM

## 2023-10-31 LAB
ABO GROUP BLD: NORMAL
AMPHET+METHAMPHET UR QL: NEGATIVE
AMPHETAMINES UR QL: NEGATIVE
BARBITURATES UR QL SCN: NEGATIVE
BASOPHILS # BLD AUTO: 0.02 10*3/MM3 (ref 0–0.2)
BASOPHILS NFR BLD AUTO: 0.2 % (ref 0–1.5)
BENZODIAZ UR QL SCN: NEGATIVE
BLD GP AB SCN SERPL QL: NEGATIVE
BUPRENORPHINE SERPL-MCNC: NEGATIVE NG/ML
CANNABINOIDS SERPL QL: NEGATIVE
COCAINE UR QL: NEGATIVE
DEPRECATED RDW RBC AUTO: 44.7 FL (ref 37–54)
EOSINOPHIL # BLD AUTO: 0.11 10*3/MM3 (ref 0–0.4)
EOSINOPHIL NFR BLD AUTO: 1.2 % (ref 0.3–6.2)
ERYTHROCYTE [DISTWIDTH] IN BLOOD BY AUTOMATED COUNT: 13.2 % (ref 12.3–15.4)
FENTANYL UR-MCNC: NEGATIVE NG/ML
HCT VFR BLD AUTO: 34.7 % (ref 34–46.6)
HGB BLD-MCNC: 11.7 G/DL (ref 12–15.9)
IMM GRANULOCYTES # BLD AUTO: 0.02 10*3/MM3 (ref 0–0.05)
IMM GRANULOCYTES NFR BLD AUTO: 0.2 % (ref 0–0.5)
LYMPHOCYTES # BLD AUTO: 1.79 10*3/MM3 (ref 0.7–3.1)
LYMPHOCYTES NFR BLD AUTO: 19.2 % (ref 19.6–45.3)
MCH RBC QN AUTO: 31.2 PG (ref 26.6–33)
MCHC RBC AUTO-ENTMCNC: 33.7 G/DL (ref 31.5–35.7)
MCV RBC AUTO: 92.5 FL (ref 79–97)
METHADONE UR QL SCN: NEGATIVE
MONOCYTES # BLD AUTO: 0.79 10*3/MM3 (ref 0.1–0.9)
MONOCYTES NFR BLD AUTO: 8.5 % (ref 5–12)
NEUTROPHILS NFR BLD AUTO: 6.6 10*3/MM3 (ref 1.7–7)
NEUTROPHILS NFR BLD AUTO: 70.7 % (ref 42.7–76)
NRBC BLD AUTO-RTO: 0 /100 WBC (ref 0–0.2)
OPIATES UR QL: NEGATIVE
OXYCODONE UR QL SCN: NEGATIVE
PCP UR QL SCN: NEGATIVE
PLATELET # BLD AUTO: 300 10*3/MM3 (ref 140–450)
PMV BLD AUTO: 9.9 FL (ref 6–12)
RBC # BLD AUTO: 3.75 10*6/MM3 (ref 3.77–5.28)
RH BLD: POSITIVE
T&S EXPIRATION DATE: NORMAL
TRICYCLICS UR QL SCN: NEGATIVE
WBC NRBC COR # BLD: 9.33 10*3/MM3 (ref 3.4–10.8)

## 2023-10-31 PROCEDURE — 86900 BLOOD TYPING SEROLOGIC ABO: CPT | Performed by: OBSTETRICS & GYNECOLOGY

## 2023-10-31 PROCEDURE — 81002 URINALYSIS NONAUTO W/O SCOPE: CPT | Performed by: MIDWIFE

## 2023-10-31 PROCEDURE — 59025 FETAL NON-STRESS TEST: CPT | Performed by: MIDWIFE

## 2023-10-31 PROCEDURE — 25810000003 LACTATED RINGERS PER 1000 ML: Performed by: OBSTETRICS & GYNECOLOGY

## 2023-10-31 PROCEDURE — 80307 DRUG TEST PRSMV CHEM ANLYZR: CPT | Performed by: MIDWIFE

## 2023-10-31 PROCEDURE — 85025 COMPLETE CBC W/AUTO DIFF WBC: CPT | Performed by: OBSTETRICS & GYNECOLOGY

## 2023-10-31 PROCEDURE — 63710000001 PROMETHAZINE PER 12.5 MG: Performed by: OBSTETRICS & GYNECOLOGY

## 2023-10-31 PROCEDURE — 86850 RBC ANTIBODY SCREEN: CPT | Performed by: OBSTETRICS & GYNECOLOGY

## 2023-10-31 PROCEDURE — 86901 BLOOD TYPING SEROLOGIC RH(D): CPT | Performed by: OBSTETRICS & GYNECOLOGY

## 2023-10-31 RX ORDER — PROMETHAZINE HYDROCHLORIDE 12.5 MG/1
12.5 TABLET ORAL EVERY 6 HOURS PRN
Status: DISCONTINUED | OUTPATIENT
Start: 2023-10-31 | End: 2023-11-01

## 2023-10-31 RX ORDER — SODIUM CHLORIDE, SODIUM LACTATE, POTASSIUM CHLORIDE, CALCIUM CHLORIDE 600; 310; 30; 20 MG/100ML; MG/100ML; MG/100ML; MG/100ML
125 INJECTION, SOLUTION INTRAVENOUS CONTINUOUS
Status: DISCONTINUED | OUTPATIENT
Start: 2023-10-31 | End: 2023-11-01

## 2023-10-31 RX ORDER — HYDROXYZINE HYDROCHLORIDE 25 MG/1
50 TABLET, FILM COATED ORAL NIGHTLY PRN
Status: DISCONTINUED | OUTPATIENT
Start: 2023-10-31 | End: 2023-11-01

## 2023-10-31 RX ORDER — ONDANSETRON 4 MG/1
4 TABLET, FILM COATED ORAL EVERY 6 HOURS PRN
Status: DISCONTINUED | OUTPATIENT
Start: 2023-10-31 | End: 2023-11-01

## 2023-10-31 RX ORDER — SODIUM CHLORIDE 0.9 % (FLUSH) 0.9 %
10 SYRINGE (ML) INJECTION EVERY 12 HOURS SCHEDULED
Status: DISCONTINUED | OUTPATIENT
Start: 2023-10-31 | End: 2023-11-01

## 2023-10-31 RX ORDER — PROMETHAZINE HYDROCHLORIDE 12.5 MG/1
12.5 SUPPOSITORY RECTAL EVERY 6 HOURS PRN
Status: DISCONTINUED | OUTPATIENT
Start: 2023-10-31 | End: 2023-11-01

## 2023-10-31 RX ORDER — ACETAMINOPHEN 325 MG/1
650 TABLET ORAL EVERY 4 HOURS PRN
Status: DISCONTINUED | OUTPATIENT
Start: 2023-10-31 | End: 2023-11-01

## 2023-10-31 RX ORDER — SODIUM CHLORIDE 9 MG/ML
40 INJECTION, SOLUTION INTRAVENOUS AS NEEDED
Status: DISCONTINUED | OUTPATIENT
Start: 2023-10-31 | End: 2023-11-01

## 2023-10-31 RX ORDER — OXYTOCIN/0.9 % SODIUM CHLORIDE 30/500 ML
1-20 PLASTIC BAG, INJECTION (ML) INTRAVENOUS
Status: DISCONTINUED | OUTPATIENT
Start: 2023-10-31 | End: 2023-11-01

## 2023-10-31 RX ORDER — LIDOCAINE HYDROCHLORIDE 10 MG/ML
0.5 INJECTION, SOLUTION INFILTRATION; PERINEURAL ONCE AS NEEDED
Status: DISCONTINUED | OUTPATIENT
Start: 2023-10-31 | End: 2023-11-01

## 2023-10-31 RX ORDER — SODIUM CHLORIDE 0.9 % (FLUSH) 0.9 %
10 SYRINGE (ML) INJECTION AS NEEDED
Status: DISCONTINUED | OUTPATIENT
Start: 2023-10-31 | End: 2023-11-01

## 2023-10-31 RX ORDER — ONDANSETRON 2 MG/ML
4 INJECTION INTRAMUSCULAR; INTRAVENOUS EVERY 6 HOURS PRN
Status: DISCONTINUED | OUTPATIENT
Start: 2023-10-31 | End: 2023-11-01

## 2023-10-31 RX ORDER — EPHEDRINE SULFATE 5 MG/ML
10 INJECTION INTRAVENOUS
Status: DISCONTINUED | OUTPATIENT
Start: 2023-10-31 | End: 2023-11-01

## 2023-10-31 RX ADMIN — PROMETHAZINE HYDROCHLORIDE 12.5 MG: 12.5 TABLET ORAL at 22:53

## 2023-10-31 RX ADMIN — SODIUM CHLORIDE, POTASSIUM CHLORIDE, SODIUM LACTATE AND CALCIUM CHLORIDE 125 ML/HR: 600; 310; 30; 20 INJECTION, SOLUTION INTRAVENOUS at 22:47

## 2023-11-01 LAB
BILIRUB BLD-MCNC: NEGATIVE MG/DL
CLARITY, POC: CLEAR
COLOR UR: YELLOW
GLUCOSE UR STRIP-MCNC: NEGATIVE MG/DL
KETONES UR QL: NEGATIVE
LEUKOCYTE EST, POC: NEGATIVE
NITRITE UR-MCNC: NEGATIVE MG/ML
PH UR: 65 [PH] (ref 5–8)
PROT UR STRIP-MCNC: NEGATIVE MG/DL
RBC # UR STRIP: NEGATIVE /UL
SP GR UR: 1.01 (ref 1–1.03)
UROBILINOGEN UR QL: NORMAL

## 2023-11-01 PROCEDURE — 0HQ9XZZ REPAIR PERINEUM SKIN, EXTERNAL APPROACH: ICD-10-PCS | Performed by: MIDWIFE

## 2023-11-01 PROCEDURE — 25810000003 LACTATED RINGERS PER 1000 ML: Performed by: MIDWIFE

## 2023-11-01 PROCEDURE — 51703 INSERT BLADDER CATH COMPLEX: CPT

## 2023-11-01 PROCEDURE — 25810000003 LACTATED RINGERS SOLUTION: Performed by: NURSE ANESTHETIST, CERTIFIED REGISTERED

## 2023-11-01 PROCEDURE — 59025 FETAL NON-STRESS TEST: CPT

## 2023-11-01 PROCEDURE — 59409 OBSTETRICAL CARE: CPT | Performed by: MIDWIFE

## 2023-11-01 PROCEDURE — C1755 CATHETER, INTRASPINAL: HCPCS | Performed by: NURSE ANESTHETIST, CERTIFIED REGISTERED

## 2023-11-01 RX ORDER — HYDROCODONE BITARTRATE AND ACETAMINOPHEN 10; 325 MG/1; MG/1
1 TABLET ORAL EVERY 4 HOURS PRN
Status: DISCONTINUED | OUTPATIENT
Start: 2023-11-01 | End: 2023-11-03 | Stop reason: HOSPADM

## 2023-11-01 RX ORDER — ACETAMINOPHEN 500 MG
1000 TABLET ORAL ONCE
Status: COMPLETED | OUTPATIENT
Start: 2023-11-01 | End: 2023-11-01

## 2023-11-01 RX ORDER — PROMETHAZINE HYDROCHLORIDE 12.5 MG/1
12.5 SUPPOSITORY RECTAL EVERY 6 HOURS PRN
Status: DISCONTINUED | OUTPATIENT
Start: 2023-11-01 | End: 2023-11-03 | Stop reason: HOSPADM

## 2023-11-01 RX ORDER — IBUPROFEN 600 MG/1
600 TABLET ORAL EVERY 6 HOURS PRN
Status: DISCONTINUED | OUTPATIENT
Start: 2023-11-01 | End: 2023-11-03 | Stop reason: HOSPADM

## 2023-11-01 RX ORDER — OXYTOCIN/0.9 % SODIUM CHLORIDE 30/500 ML
999 PLASTIC BAG, INJECTION (ML) INTRAVENOUS ONCE
Status: DISCONTINUED | OUTPATIENT
Start: 2023-11-01 | End: 2023-11-01 | Stop reason: HOSPADM

## 2023-11-01 RX ORDER — CARBOPROST TROMETHAMINE 250 UG/ML
250 INJECTION, SOLUTION INTRAMUSCULAR ONCE AS NEEDED
Status: DISCONTINUED | OUTPATIENT
Start: 2023-11-01 | End: 2023-11-01 | Stop reason: HOSPADM

## 2023-11-01 RX ORDER — MORPHINE SULFATE 2 MG/ML
2 INJECTION, SOLUTION INTRAMUSCULAR; INTRAVENOUS ONCE AS NEEDED
Status: DISCONTINUED | OUTPATIENT
Start: 2023-11-01 | End: 2023-11-01 | Stop reason: HOSPADM

## 2023-11-01 RX ORDER — ONDANSETRON 4 MG/1
4 TABLET, FILM COATED ORAL ONCE AS NEEDED
Status: DISCONTINUED | OUTPATIENT
Start: 2023-11-01 | End: 2023-11-01 | Stop reason: HOSPADM

## 2023-11-01 RX ORDER — DOCUSATE SODIUM 100 MG/1
100 CAPSULE, LIQUID FILLED ORAL 2 TIMES DAILY PRN
Status: DISCONTINUED | OUTPATIENT
Start: 2023-11-01 | End: 2023-11-03 | Stop reason: HOSPADM

## 2023-11-01 RX ORDER — PRENATAL VIT/IRON FUM/FOLIC AC 27MG-0.8MG
1 TABLET ORAL DAILY
Status: DISCONTINUED | OUTPATIENT
Start: 2023-11-01 | End: 2023-11-03 | Stop reason: HOSPADM

## 2023-11-01 RX ORDER — ACETAMINOPHEN 325 MG/1
650 TABLET ORAL EVERY 6 HOURS PRN
Status: DISCONTINUED | OUTPATIENT
Start: 2023-11-01 | End: 2023-11-03 | Stop reason: HOSPADM

## 2023-11-01 RX ORDER — ONDANSETRON 4 MG/1
4 TABLET, FILM COATED ORAL EVERY 8 HOURS PRN
Status: DISCONTINUED | OUTPATIENT
Start: 2023-11-01 | End: 2023-11-03 | Stop reason: HOSPADM

## 2023-11-01 RX ORDER — OXYTOCIN/0.9 % SODIUM CHLORIDE 30/500 ML
125 PLASTIC BAG, INJECTION (ML) INTRAVENOUS CONTINUOUS PRN
Status: DISCONTINUED | OUTPATIENT
Start: 2023-11-01 | End: 2023-11-03 | Stop reason: HOSPADM

## 2023-11-01 RX ORDER — ONDANSETRON 2 MG/ML
4 INJECTION INTRAMUSCULAR; INTRAVENOUS ONCE AS NEEDED
Status: DISCONTINUED | OUTPATIENT
Start: 2023-11-01 | End: 2023-11-01 | Stop reason: HOSPADM

## 2023-11-01 RX ORDER — HYDROCORTISONE 25 MG/G
CREAM TOPICAL AS NEEDED
Status: DISCONTINUED | OUTPATIENT
Start: 2023-11-01 | End: 2023-11-03 | Stop reason: HOSPADM

## 2023-11-01 RX ORDER — HYDROCODONE BITARTRATE AND ACETAMINOPHEN 5; 325 MG/1; MG/1
1 TABLET ORAL EVERY 4 HOURS PRN
Status: DISCONTINUED | OUTPATIENT
Start: 2023-11-01 | End: 2023-11-01 | Stop reason: HOSPADM

## 2023-11-01 RX ORDER — METHYLERGONOVINE MALEATE 0.2 MG/ML
200 INJECTION INTRAVENOUS ONCE AS NEEDED
Status: DISCONTINUED | OUTPATIENT
Start: 2023-11-01 | End: 2023-11-01 | Stop reason: HOSPADM

## 2023-11-01 RX ORDER — ACETAMINOPHEN 325 MG/1
650 TABLET ORAL ONCE AS NEEDED
Status: DISCONTINUED | OUTPATIENT
Start: 2023-11-01 | End: 2023-11-01 | Stop reason: HOSPADM

## 2023-11-01 RX ORDER — ONDANSETRON 2 MG/ML
4 INJECTION INTRAMUSCULAR; INTRAVENOUS EVERY 6 HOURS PRN
Status: DISCONTINUED | OUTPATIENT
Start: 2023-11-01 | End: 2023-11-03 | Stop reason: HOSPADM

## 2023-11-01 RX ORDER — DIPHENHYDRAMINE HCL 25 MG
25 CAPSULE ORAL NIGHTLY PRN
Status: DISCONTINUED | OUTPATIENT
Start: 2023-11-01 | End: 2023-11-03 | Stop reason: HOSPADM

## 2023-11-01 RX ORDER — SODIUM CHLORIDE 0.9 % (FLUSH) 0.9 %
1-10 SYRINGE (ML) INJECTION AS NEEDED
Status: DISCONTINUED | OUTPATIENT
Start: 2023-11-01 | End: 2023-11-03 | Stop reason: HOSPADM

## 2023-11-01 RX ORDER — MAGNESIUM CARB/ALUMINUM HYDROX 105-160MG
30 TABLET,CHEWABLE ORAL ONCE
Status: DISCONTINUED | OUTPATIENT
Start: 2023-11-01 | End: 2023-11-01

## 2023-11-01 RX ORDER — OXYTOCIN/0.9 % SODIUM CHLORIDE 30/500 ML
250 PLASTIC BAG, INJECTION (ML) INTRAVENOUS CONTINUOUS
Status: ACTIVE | OUTPATIENT
Start: 2023-11-01 | End: 2023-11-01

## 2023-11-01 RX ORDER — MISOPROSTOL 200 UG/1
800 TABLET ORAL ONCE AS NEEDED
Status: DISCONTINUED | OUTPATIENT
Start: 2023-11-01 | End: 2023-11-01 | Stop reason: HOSPADM

## 2023-11-01 RX ORDER — BISACODYL 10 MG
10 SUPPOSITORY, RECTAL RECTAL DAILY PRN
Status: DISCONTINUED | OUTPATIENT
Start: 2023-11-02 | End: 2023-11-03 | Stop reason: HOSPADM

## 2023-11-01 RX ORDER — HYDROCODONE BITARTRATE AND ACETAMINOPHEN 5; 325 MG/1; MG/1
1 TABLET ORAL EVERY 4 HOURS PRN
Status: DISCONTINUED | OUTPATIENT
Start: 2023-11-01 | End: 2023-11-03 | Stop reason: HOSPADM

## 2023-11-01 RX ORDER — PROMETHAZINE HYDROCHLORIDE 25 MG/1
25 TABLET ORAL EVERY 6 HOURS PRN
Status: DISCONTINUED | OUTPATIENT
Start: 2023-11-01 | End: 2023-11-03 | Stop reason: HOSPADM

## 2023-11-01 RX ORDER — PROMETHAZINE HYDROCHLORIDE 12.5 MG/1
12.5 TABLET ORAL EVERY 6 HOURS PRN
Status: DISCONTINUED | OUTPATIENT
Start: 2023-11-01 | End: 2023-11-01 | Stop reason: HOSPADM

## 2023-11-01 RX ADMIN — Medication 10 ML/HR: at 00:55

## 2023-11-01 RX ADMIN — SODIUM CHLORIDE, POTASSIUM CHLORIDE, SODIUM LACTATE AND CALCIUM CHLORIDE 1000 ML: 600; 310; 30; 20 INJECTION, SOLUTION INTRAVENOUS at 00:51

## 2023-11-01 RX ADMIN — IBUPROFEN 600 MG: 600 TABLET ORAL at 16:16

## 2023-11-01 RX ADMIN — ACETAMINOPHEN 1000 MG: 500 TABLET, FILM COATED ORAL at 07:41

## 2023-11-01 RX ADMIN — HYDROCODONE BITARTRATE AND ACETAMINOPHEN 1 TABLET: 5; 325 TABLET ORAL at 23:33

## 2023-11-01 RX ADMIN — Medication: at 16:17

## 2023-11-01 RX ADMIN — SODIUM CHLORIDE, POTASSIUM CHLORIDE, SODIUM LACTATE AND CALCIUM CHLORIDE 125 ML/HR: 600; 310; 30; 20 INJECTION, SOLUTION INTRAVENOUS at 04:04

## 2023-11-01 RX ADMIN — Medication 2 MILLI-UNITS/MIN: at 04:04

## 2023-11-01 RX ADMIN — IBUPROFEN 600 MG: 600 TABLET ORAL at 23:33

## 2023-11-01 NOTE — PLAN OF CARE
Goal Outcome Evaluation:             of viable male infant. Patient fundal and lochia wnl, no c/o pain at this time. Stable to transfer to postpartum. Report given to EFFIE Delgadillo

## 2023-11-01 NOTE — L&D DELIVERY NOTE
" [facility]   Vaginal Delivery Note    Patient Name: Iza Mendieta  : 1995  MRN: [id number]    Date of Delivery: 2023     Diagnosis     Pre & Post-Delivery:  Intrauterine pregnancy at 39w1d  Labor status:      Pregnancy     (spontaneous vaginal delivery)             Problem List    Transfer to Postpartum     Review the Delivery Report for details.     Delivery     Delivery: Vaginal, Spontaneous     YOB: 2024    Time of Birth:  Gestational Age 7:00 AM   39w1d     Anesthesia: Epidural     Delivering clinician: Suad MOJICA Foster    Forceps?   No   Vacuum? No    Shoulder dystocia present: No        Delivery narrative:  Renée progressed to C+P and pushed effectively. Mouth and nose bulb suctioned on perineum. Right nuchal arm noted. Left anterior shoulder was delivered easily with gentle traction and maternal effort followed by posterior shoulder.  viable male. Infant stimulated, dried, and placed on mom's abdomen. Infant with lusty cry and spontaneous respirations. Delayed cord double clamped and cut. Cord blood obtained. Placenta delivered Schultze intact with 3V cord. Pitocin 20 units IV given.  First degree perineal laceration repaired. FF @ U, light rubra lochia.       Infant     Findings: male  infant     Infant observations: Weight: 3083 g (6 lb 12.8 oz)   Length: 20.75  in  Observations/Comments:        Apgars: 8  @ 1 minute /    9  @ 5 minutes   Infant Name: Micki     Placenta & Cord         Placenta delivered  Spontaneous  at   2023  7:04 AM     Cord: 3 vessels  present.   Nuchal Cord?  no   Cord blood obtained: Yes    Cord gases obtained:  No    Cord gas results: Venous:  No results found for: \"PHCVEN\", \"BECVEN\"    Arterial:  No results found for: \"PHCART\", \"BECART\"     Repair     Episiotomy: None     No    Lacerations: Yes  Laceration Information  Laceration Repaired?   Perineal: 1st  Yes    Periurethral:       Labial:       Sulcus:       Vaginal:     "   Cervical:         Suture used for repair: 3-0 chromic gut  Laceration Length for 3rd or 4th degree lacerations:  cm   Estimated Blood Loss: 200 ml     Quantitative Blood Loss:          Complications     none    Disposition     Mother to Mother Baby/Postpartum  in stable condition currently.  Baby remains with mom  in stable condition currently.    Suad Valdez CNM  11/01/23  07:31 EDT

## 2023-11-01 NOTE — H&P
[facility]  Iza Mendieta  : 1995  MRN: [id number]  CSN: [id number]    History and Physical    Chief Complaint   Patient presents with    Scheduled Induction      Subjective   Iza Mendieta is a 28 y.o. year old  with an Estimated Date of Delivery: 23 currently at 39w1d presenting for induction of labor but states she began having ctxs about 1700. They were every 2-5 minutes upon arrival. VE was 3-4/70/-2 upon arrival and she progressed to 4/80/-2 and requested an epidural. Pitocin was never started.    Prenatal care has been with [facility].  It has been complicated by chlamydia and trichomoniasis with YESENIA being negative, anemia treated with iron, and late prenatal care due to incarceration due to parole violation. She also had an  Nexplanon in place that was removed during second trimester. First PNV on 23 @ 19 weeks with 9 visits. Weight gain = 25 lbs.    OB History    Para Term  AB Living   2 1 1 0 0 1   SAB IAB Ectopic Molar Multiple Live Births   0 0 0 0 0 1      # Outcome Date GA Lbr Federico/2nd Weight Sex Delivery Anes PTL Lv   2 Current            1 Term 13 39w0d  3260 g (7 lb 3 oz) M Vag-Spont EPI  RAKESH     Past Medical History:   Diagnosis Date    Chlamydia     Urogenital trichomoniasis      Past Surgical History:   Procedure Laterality Date    NO PAST SURGERIES         Allergies   Allergen Reactions    Amoxicillin Anaphylaxis    Penicillins Anaphylaxis     Social History    Tobacco Use      Smoking status: Former        Types: Cigarettes      Smokeless tobacco: Former    Review of Systems   Constitutional: Negative.    Respiratory: Negative.     Cardiovascular: Negative.    Gastrointestinal: Negative.    Genitourinary: Negative.    Musculoskeletal: Negative.    Neurological: Negative.    Psychiatric/Behavioral: Negative.     All other systems reviewed and are negative.        Objective   BP 97/58   Pulse 85   Temp 98.8 °F (37.1 °C) (Oral)   " Resp 16   Ht 170.2 cm (67.01\")   SpO2 99%   Breastfeeding No   BMI 23.49 kg/m²   General: well developed; well nourished  no acute distress   Neck:    Heart:   supple and no masses  normal apical impulse  regular rate and rhythm   Lungs: breathing is unlabored  clear to auscultation bilaterally   Abdomen: gravid  EFW: 7#, growth scan @ 34 weeks=35%, posterior placenta   FHT's: reassuring, reactive, and category 1   Cervix: was checked (by me): 4-5 cm / 80 % / -2   Presentation: cephalic   Contractions: every 2-5 minutes - external monitors used   Extremities:   normal appearance with no cyanosis or edema and no calf tenderness   Skin:  Skin color, texture, turgor normal. No rashes or lesions or Skin warm and dry   Psych:  Normal. and Alert and oriented, appropriate affect.     Prenatal Labs  Lab Results   Component Value Date    HGB 11.7 (L) 10/31/2023    HEPBSAG Negative 06/13/2023    ABSCRN Negative 10/31/2023    [id number] Non Reactive 06/13/2023    HEPCVIRUSABY Non Reactive 06/13/2023    STREPGPB Negative 10/19/2023       Current Labs Reviewed   Lab Results (last 24 hours)       Procedure Component Value Units Date/Time    Fentanyl, Urine - Urine, Clean Catch [[id number]]  (Normal) Collected: 10/31/23 2248    Specimen: Urine, Clean Catch Updated: 10/31/23 2317     Fentanyl, Urine Negative    Narrative:      Negative Threshold:      Fentanyl 5 ng/mL     The normal value for the drug tested is negative. This report includes final unconfirmed screening results to be used for medical treatment purposes only. Unconfirmed results must not be used for non-medical purposes such as employment or legal testing. Clinical consideration should be applied to any drug of abuse test, particularly when unconfirmed results are used.           Urine Drug Screen - Urine, Clean Catch [[id number]]  (Normal) Collected: 10/31/23 2248    Specimen: Urine, Clean Catch Updated: 10/31/23 2312     THC, Screen, Urine Negative     " Phencyclidine (PCP), Urine Negative     Cocaine Screen, Urine Negative     Methamphetamine, Ur Negative     Opiate Screen Negative     Amphetamine Screen, Urine Negative     Benzodiazepine Screen, Urine Negative     Tricyclic Antidepressants Screen Negative     Methadone Screen, Urine Negative     Barbiturates Screen, Urine Negative     Oxycodone Screen, Urine Negative     Buprenorphine, Screen, Urine Negative    Narrative:      Cutoff For Drugs Screened:    Amphetamines               500 ng/ml  Barbiturates               200 ng/ml  Benzodiazepines            150 ng/ml  Cocaine                    150 ng/ml  Methadone                  200 ng/ml  Opiates                    100 ng/ml  Phencyclidine               25 ng/ml  THC                         50 ng/ml  Methamphetamine            500 ng/ml  Tricyclic Antidepressants  300 ng/ml  Oxycodone                  100 ng/ml  Buprenorphine               10 ng/ml    The normal value for all drugs tested is negative. This report includes unconfirmed screening results, with the cutoff values listed, to be used for medical treatment purposes only.  Unconfirmed results must not be used for non-medical purposes such as employment or legal testing.  Clinical consideration should be applied to any drug of abuse test, particularly when unconfirmed results are used.      CBC & Differential [[id number]]  (Abnormal) Collected: 10/31/23 2005    Specimen: Blood Updated: 10/31/23 2024    Narrative:      The following orders were created for panel order CBC & Differential.  Procedure                               Abnormality         Status                     ---------                               -----------         ------                     CBC Auto Differential[[id number]]        Abnormal            Final result                 Please view results for these tests on the individual orders.    CBC Auto Differential [[id number]]  (Abnormal) Collected: 10/31/23 2005    Specimen: Blood  Updated: 10/31/23 2024     WBC 9.33 10*3/mm3      RBC 3.75 10*6/mm3      Hemoglobin 11.7 g/dL      Hematocrit 34.7 %      MCV 92.5 fL      MCH 31.2 pg      MCHC 33.7 g/dL      RDW 13.2 %      RDW-SD 44.7 fl      MPV 9.9 fL      Platelets 300 10*3/mm3      Neutrophil % 70.7 %      Lymphocyte % 19.2 %      Monocyte % 8.5 %      Eosinophil % 1.2 %      Basophil % 0.2 %      Immature Grans % 0.2 %      Neutrophils, Absolute 6.60 10*3/mm3      Lymphocytes, Absolute 1.79 10*3/mm3      Monocytes, Absolute 0.79 10*3/mm3      Eosinophils, Absolute 0.11 10*3/mm3      Basophils, Absolute 0.02 10*3/mm3      Immature Grans, Absolute 0.02 10*3/mm3      nRBC 0.0 /100 WBC                ASSESSMENT  IUP at 39w1d  Group B strep status: negative  Late prenatal care  Anemia  Reactive NST  Early spontaneous labor    PLAN  Admit to   Augment with Pitocin if needed  OK for epidural  Anticipate     Suad Valdez, APRN  2023  01:51 EDT

## 2023-11-01 NOTE — ANESTHESIA PROCEDURE NOTES
Labor Epidural      Patient reassessed immediately prior to procedure    Patient location during procedure: OB  Start Time: 11/1/2023 12:45 AM  Stop Time: 11/1/2023 1:07 AM  Indication:at surgeon's request  Performed By  CRNA/CAA: Anthony Lugo CRNA  Preanesthetic Checklist  Completed: patient identified, IV checked, site marked, risks and benefits discussed, surgical consent, monitors and equipment checked, pre-op evaluation and timeout performed  Additional Notes  Risks discussed , including but not limited to:  Headache, itching, n&v, infection, failure, decreased bp, decreased fetal hr, possible c/s. Permanent chronic back pain, nerve damage, paralysis, etc. All questions answered and informed consent obtained.    Skin localized with lidocaine skin wheal.  Test dose 3 ml of 1.5% lidocaine with 1:200,000 Epinephrine    Bolus dose of 10ml Fentanyl/Ropivicaine pumpmix solution given and infusion started.  Prep:  Pt Position:sitting  Sterile Tech:cap, gloves, mask and sterile barrier  Prep:chlorhexidine gluconate and isopropyl alcohol  Monitoring:blood pressure monitoring and continuous pulse oximetry  Epidural Block Procedure:  Approach:midline  Guidance:landmark technique and palpation technique  Location:L4-L5  Needle Type:Tuohy  Needle Gauge:18 G  Loss of Resistance Medium: saline  Loss of Resistance: 7cm  Cath Depth at skin:11 cm  Paresthesia: none  Aspiration:negative  Test Dose:negative  Number of Attempts: 1  Post Assessment:  Dressing:occlusive dressing applied and secured with tape  Pt Tolerance:patient tolerated the procedure well with no apparent complications  Complications:no

## 2023-11-01 NOTE — ANESTHESIA PROCEDURE NOTES
Labor Epidural      Patient reassessed immediately prior to procedure    Patient location during procedure: OB  Start Time: 11/1/2023 3:37 AM  Stop Time: 11/1/2023 3:58 AM  Indication:at surgeon's request  Performed By  CRNA/CAA: Anthony Lugo CRNA  Preanesthetic Checklist  Completed: patient identified, IV checked, site marked, risks and benefits discussed, surgical consent, monitors and equipment checked, pre-op evaluation and timeout performed  Additional Notes  Risks discussed , including but not limited to:  Headache, itching, n&v, infection, failure, decreased bp, decreased fetal hr, possible c/s. Permanent chronic back pain, nerve damage, paralysis, etc. All questions answered and informed consent obtained.    Skin localized with lidocaine skin wheal.  Test dose 3 ml of 1.5% lidocaine with 1:200,000 Epinephrine    Bolus dose of 5ml Fentanyl/Ropivicaine pumpmix solution given and infusion started.  Prep:  Pt Position:sitting  Sterile Tech:cap, gloves, mask and sterile barrier  Prep:chlorhexidine gluconate and isopropyl alcohol  Monitoring:blood pressure monitoring and continuous pulse oximetry  Epidural Block Procedure:  Approach:midline  Guidance:landmark technique and palpation technique  Location:L3-L4  Needle Type:Tuohy  Needle Gauge:18 G  Loss of Resistance Medium: saline  Loss of Resistance: 7cm  Cath Depth at skin:11 cm  Paresthesia: none  Aspiration:negative  Test Dose:negative  Number of Attempts: 1  Post Assessment:  Dressing:occlusive dressing applied and secured with tape  Pt Tolerance:patient tolerated the procedure well with no apparent complications  Complications:no

## 2023-11-01 NOTE — PAYOR COMM NOTE
"TO:[facility]  FROM:DADA DURAN, RN PHONE [phone]  INPT NOTIFICATION AND CLINICALS OB DELIVERY  TAX ID [id number] NPI [id number] DX CODE:[id number]  REQUEST 3 DAYS DID NOT DELIVER ON ADMIT DAY   EDC 23@39 1/7 WEEKS  DELIVERED @0700  MALE 6#[id number] APG     Iza Mendieta \"Renée\" (28 y.o. Female)       Date of Birth   1995    Social Security Number       Address   [address] [address]    Home Phone   [phone]    MRN   [id number]       Y[employer]ism   [employer]    Marital Status   Single                            Admission Date   10/31/23    Admission Type   Elective    Admitting Provider   Gabriel Hargrove MD    Attending Provider   Suad Valdez CNM    Department, Room/Bed   [facility],        Discharge Date       Discharge Disposition       Discharge Destination                                 Attending Provider: Suad Valdez CNM    Allergies: Amoxicillin, Penicillins    Isolation: None   Infection: None   Code Status: CPR    Ht: 170.2 cm (67.01\")   Wt: 68 kg (150 lb)    Admission Cmt: None   Principal Problem: Pregnancy [Z34.90]                   Active Insurance as of 10/31/2023       Primary Coverage       Payor Plan Insurance Group Employer/Plan Group    ANTHEM MEDICAID ANTHEM MEDICAID [id number]       Payor Plan Address Payor Plan Phone Number Payor Plan Fax Number Effective Dates    [address] [address] [phone]  2023 - None Entered    [facility] [phone]         Subscriber Name Subscriber Birth Date Member ID       IZA MENDIETA 1995 [id number]                     Emergency Contacts        (Rel.) Home Phone Work Phone Mobile Phone    SHELLI DORAN (Other) [phone] -- --                 History & Physical        Suad Valdez CNM at 23 0151       Attestation signed by Gabriel Hargrove MD at 23 0644    I agree with the assessment and plan.    Gabriel BOWERS " MD Beena  Obstetrics and Gynecology  [facility]  Iza Mendieta  : 1995  MRN: [id number]  CSN: [id number]    History and Physical    Chief Complaint   Patient presents with    Scheduled Induction      Subjective  Iza Mendieta is a 28 y.o. year old  with an Estimated Date of Delivery: 23 currently at 39w1d presenting for induction of labor but states she began having ctxs about 1700. They were every 2-5 minutes upon arrival. VE was 3-4/70/-2 upon arrival and she progressed to 4/80/-2 and requested an epidural. Pitocin was never started.    Prenatal care has been with [facility].  It has been complicated by chlamydia and trichomoniasis with YESENIA being negative, anemia treated with iron, and late prenatal care due to incarceration due to parole violation. She also had an  Nexplanon in place that was removed during second trimester. First PNV on 23 @ 19 weeks with 9 visits. Weight gain = 25 lbs.    OB History    Para Term  AB Living   2 1 1 0 0 1   SAB IAB Ectopic Molar Multiple Live Births   0 0 0 0 0 1      # Outcome Date GA Lbr Federico/2nd Weight Sex Delivery Anes PTL Lv   2 Current            1 Term 13 39w0d  3260 g (7 lb 3 oz) M Vag-Spont EPI  RAKESH     Past Medical History:   Diagnosis Date    Chlamydia     Urogenital trichomoniasis      Past Surgical History:   Procedure Laterality Date    NO PAST SURGERIES         Allergies   Allergen Reactions    Amoxicillin Anaphylaxis    Penicillins Anaphylaxis     Social History    Tobacco Use      Smoking status: Former        Types: Cigarettes      Smokeless tobacco: Former    Review of Systems   Constitutional: Negative.    Respiratory: Negative.     Cardiovascular: Negative.    Gastrointestinal: Negative.    Genitourinary: Negative.    Musculoskeletal: Negative.    Neurological: Negative.    Psychiatric/Behavioral: Negative.     All other systems reviewed and are  "negative.        Objective  BP 97/58   Pulse 85   Temp 98.8 °F (37.1 °C) (Oral)   Resp 16   Ht 170.2 cm (67.01\")   SpO2 99%   Breastfeeding No   BMI 23.49 kg/m²   General: well developed; well nourished  no acute distress   Neck:    Heart:   supple and no masses  normal apical impulse  regular rate and rhythm   Lungs: breathing is unlabored  clear to auscultation bilaterally   Abdomen: gravid  EFW: 7#, growth scan @ 34 weeks=35%, posterior placenta   FHT's: reassuring, reactive, and category 1   Cervix: was checked (by me): 4-5 cm / 80 % / -2   Presentation: cephalic   Contractions: every 2-5 minutes - external monitors used   Extremities:   normal appearance with no cyanosis or edema and no calf tenderness   Skin:  Skin color, texture, turgor normal. No rashes or lesions or Skin warm and dry   Psych:  Normal. and Alert and oriented, appropriate affect.     Prenatal Labs  Lab Results   Component Value Date    HGB 11.7 (L) 10/31/2023    HEPBSAG Negative 06/13/2023    ABSCRN Negative 10/31/2023    [id number] Non Reactive 06/13/2023    HEPCVIRUSABY Non Reactive 06/13/2023    STREPGPB Negative 10/19/2023       Current Labs Reviewed   Lab Results (last 24 hours)       Procedure Component Value Units Date/Time    Fentanyl, Urine - Urine, Clean Catch [[id number]]  (Normal) Collected: 10/31/23 2248    Specimen: Urine, Clean Catch Updated: 10/31/23 2317     Fentanyl, Urine Negative    Narrative:      Negative Threshold:      Fentanyl 5 ng/mL     The normal value for the drug tested is negative. This report includes final unconfirmed screening results to be used for medical treatment purposes only. Unconfirmed results must not be used for non-medical purposes such as employment or legal testing. Clinical consideration should be applied to any drug of abuse test, particularly when unconfirmed results are used.           Urine Drug Screen - Urine, Clean Catch [[id number]]  (Normal) Collected: 10/31/23 2248    Specimen: " Urine, Clean Catch Updated: 10/31/23 2312     THC, Screen, Urine Negative     Phencyclidine (PCP), Urine Negative     Cocaine Screen, Urine Negative     Methamphetamine, Ur Negative     Opiate Screen Negative     Amphetamine Screen, Urine Negative     Benzodiazepine Screen, Urine Negative     Tricyclic Antidepressants Screen Negative     Methadone Screen, Urine Negative     Barbiturates Screen, Urine Negative     Oxycodone Screen, Urine Negative     Buprenorphine, Screen, Urine Negative    Narrative:      Cutoff For Drugs Screened:    Amphetamines               500 ng/ml  Barbiturates               200 ng/ml  Benzodiazepines            150 ng/ml  Cocaine                    150 ng/ml  Methadone                  200 ng/ml  Opiates                    100 ng/ml  Phencyclidine               25 ng/ml  THC                         50 ng/ml  Methamphetamine            500 ng/ml  Tricyclic Antidepressants  300 ng/ml  Oxycodone                  100 ng/ml  Buprenorphine               10 ng/ml    The normal value for all drugs tested is negative. This report includes unconfirmed screening results, with the cutoff values listed, to be used for medical treatment purposes only.  Unconfirmed results must not be used for non-medical purposes such as employment or legal testing.  Clinical consideration should be applied to any drug of abuse test, particularly when unconfirmed results are used.      CBC & Differential [[id number]]  (Abnormal) Collected: 10/31/23 2005    Specimen: Blood Updated: 10/31/23 2024    Narrative:      The following orders were created for panel order CBC & Differential.  Procedure                               Abnormality         Status                     ---------                               -----------         ------                     CBC Auto Differential[[id number]]        Abnormal            Final result                 Please view results for these tests on the individual orders.    CBC Auto  Differential [[id number]]  (Abnormal) Collected: 10/31/23 2005    Specimen: Blood Updated: 10/31/23 2024     WBC 9.33 10*3/mm3      RBC 3.75 10*6/mm3      Hemoglobin 11.7 g/dL      Hematocrit 34.7 %      MCV 92.5 fL      MCH 31.2 pg      MCHC 33.7 g/dL      RDW 13.2 %      RDW-SD 44.7 fl      MPV 9.9 fL      Platelets 300 10*3/mm3      Neutrophil % 70.7 %      Lymphocyte % 19.2 %      Monocyte % 8.5 %      Eosinophil % 1.2 %      Basophil % 0.2 %      Immature Grans % 0.2 %      Neutrophils, Absolute 6.60 10*3/mm3      Lymphocytes, Absolute 1.79 10*3/mm3      Monocytes, Absolute 0.79 10*3/mm3      Eosinophils, Absolute 0.11 10*3/mm3      Basophils, Absolute 0.02 10*3/mm3      Immature Grans, Absolute 0.02 10*3/mm3      nRBC 0.0 /100 WBC                ASSESSMENT  IUP at 39w1d  Group B strep status: negative  Late prenatal care  Anemia  Reactive NST  Early spontaneous labor    PLAN  Admit to   Augment with Pitocin if needed  OK for epidural  Anticipate     Suad Valdez, APRN  2023  01:51 EDT        Electronically signed by Gabriel Hargrove MD at 23 0644       Physician Progress Notes (last 24 hours)  Notes from 10/31/23 1226 through 23 1226   No notes of this type exist for this encounter.

## 2023-11-01 NOTE — PLAN OF CARE
Goal Outcome Evaluation:      Ambulating and voiding with no difficulties.  Tolerating diet.  Discomfort to annalee area relieved with annalee care and PO medications.  Uses annalee bottle and ice packs as directed and verbalizes understanding of sitz bath.

## 2023-11-01 NOTE — ANESTHESIA PREPROCEDURE EVALUATION
Anesthesia Evaluation     Patient summary reviewed and Nursing notes reviewed   no history of anesthetic complications:   NPO Solid Status: > 8 hours  NPO Liquid Status: > 8 hours           Airway   Mallampati: II  TM distance: >3 FB  Neck ROM: full  no difficulty expected and Possible difficult intubation  Dental - normal exam     Pulmonary - negative pulmonary ROS and normal exam   Cardiovascular - negative cardio ROS and normal exam    Rhythm: regular  Rate: normal        Neuro/Psych- negative ROS  GI/Hepatic/Renal/Endo - negative ROS     Musculoskeletal (-) negative ROS    Abdominal    Substance History - negative use     OB/GYN    (+) Pregnant        Other - negative ROS                   Anesthesia Plan    ASA 2 - emergent     epidural     (Risks of epidural analgesia discussed, including but not limited to:  Headache, itching, n&v, infection, failure, decreased bp, decreased fetal hr, possible c/s. Permanent chronic back pain, nerve damage, paralysis, etc.    Patient told that epidural analgesia may not relieve all the labor pain, that she may still feel pressure and that she may still feel pain as the baby is close to delivery.     All questions answered and informed consent obtained.    )    Anesthetic plan, risks, benefits, and alternatives have been provided, discussed and informed consent has been obtained with: patient.    CODE STATUS:    Code Status (Patient has no pulse and is not breathing): CPR (Attempt to Resuscitate)  Medical Interventions (Patient has pulse or is breathing): Full

## 2023-11-01 NOTE — PLAN OF CARE
Goal Outcome Evaluation:                        Problem: Adult Inpatient Plan of Care  Goal: Plan of Care Review  Outcome: Ongoing, Progressing  Flowsheets (Taken 10/31/2023 2150)  Progress: no change  Plan of Care Reviewed With: patient  Outcome Evaluation: Presented to labor [facility] for scheduled induction VE 3-4 70-80% sl posterior, VSS reactive NST. admit to labor [facility]

## 2023-11-01 NOTE — NON STRESS TEST
Iza Mendieta, a  at 39w1d with an MEREDITH of 2023, by Ultrasound, was seen at [facility] for a nonstress test.    Chief Complaint   Patient presents with    Scheduled Induction       Patient Active Problem List   Diagnosis    Late prenatal care    Chlamydia infection affecting pregnancy    Trichomonal vaginitis during pregnancy    Pregnancy       Start Time:   Stop Time:     Interpretation A  Nonstress Test Interpretation A: Reactive

## 2023-11-01 NOTE — PLAN OF CARE
Goal Outcome Evaluation:  Plan of Care Reviewed With: patient        Progress: improving  Outcome Evaluation: presented to labor [facility] for schedujled induction, having spont. contractions and VE 3-4, was informed to hesitate with pitocin  having increase discomfort and epidural was place.  Continues to have pain so a second epidural was placed and pitocin started at 0400.         Problem: Adult Inpatient Plan of Care  Goal: Plan of Care Review  11/1/2023 0423 by Magda Da Silva RN  Outcome: Ongoing, Progressing  Flowsheets (Taken 11/1/2023 0423)  Progress: improving  Plan of Care Reviewed With: patient  Outcome Evaluation: presented to labor [facility] for schedujled induction, having spont. contractions and VE 3-4, was informed to hesitate with pitocin  having increase discomfort and epidural was place.  Continues to have pain so a second epidural was placed and pitocin started at 0400.

## 2023-11-02 LAB
BASOPHILS # BLD AUTO: 0.02 10*3/MM3 (ref 0–0.2)
BASOPHILS NFR BLD AUTO: 0.2 % (ref 0–1.5)
DEPRECATED RDW RBC AUTO: 46.2 FL (ref 37–54)
EOSINOPHIL # BLD AUTO: 0.11 10*3/MM3 (ref 0–0.4)
EOSINOPHIL NFR BLD AUTO: 1.1 % (ref 0.3–6.2)
ERYTHROCYTE [DISTWIDTH] IN BLOOD BY AUTOMATED COUNT: 13.3 % (ref 12.3–15.4)
HCT VFR BLD AUTO: 30.7 % (ref 34–46.6)
HGB BLD-MCNC: 10.2 G/DL (ref 12–15.9)
IMM GRANULOCYTES # BLD AUTO: 0.04 10*3/MM3 (ref 0–0.05)
IMM GRANULOCYTES NFR BLD AUTO: 0.4 % (ref 0–0.5)
LYMPHOCYTES # BLD AUTO: 2.29 10*3/MM3 (ref 0.7–3.1)
LYMPHOCYTES NFR BLD AUTO: 22.2 % (ref 19.6–45.3)
MCH RBC QN AUTO: 31.4 PG (ref 26.6–33)
MCHC RBC AUTO-ENTMCNC: 33.2 G/DL (ref 31.5–35.7)
MCV RBC AUTO: 94.5 FL (ref 79–97)
MONOCYTES # BLD AUTO: 0.87 10*3/MM3 (ref 0.1–0.9)
MONOCYTES NFR BLD AUTO: 8.4 % (ref 5–12)
NEUTROPHILS NFR BLD AUTO: 6.99 10*3/MM3 (ref 1.7–7)
NEUTROPHILS NFR BLD AUTO: 67.7 % (ref 42.7–76)
NRBC BLD AUTO-RTO: 0 /100 WBC (ref 0–0.2)
PLATELET # BLD AUTO: 243 10*3/MM3 (ref 140–450)
PMV BLD AUTO: 10.3 FL (ref 6–12)
RBC # BLD AUTO: 3.25 10*6/MM3 (ref 3.77–5.28)
WBC NRBC COR # BLD: 10.32 10*3/MM3 (ref 3.4–10.8)

## 2023-11-02 PROCEDURE — 85025 COMPLETE CBC W/AUTO DIFF WBC: CPT | Performed by: MIDWIFE

## 2023-11-02 PROCEDURE — 90715 TDAP VACCINE 7 YRS/> IM: CPT | Performed by: OBSTETRICS & GYNECOLOGY

## 2023-11-02 PROCEDURE — 90471 IMMUNIZATION ADMIN: CPT | Performed by: OBSTETRICS & GYNECOLOGY

## 2023-11-02 PROCEDURE — 25010000002 TETANUS-DIPHTH-ACELL PERTUSSIS 5-2.5-18.5 LF-MCG/0.5 SUSPENSION PREFILLED SYRINGE: Performed by: OBSTETRICS & GYNECOLOGY

## 2023-11-02 RX ORDER — FERROUS SULFATE 324(65)MG
324 TABLET, DELAYED RELEASE (ENTERIC COATED) ORAL 2 TIMES DAILY WITH MEALS
Status: DISCONTINUED | OUTPATIENT
Start: 2023-11-02 | End: 2023-11-03 | Stop reason: HOSPADM

## 2023-11-02 RX ADMIN — FERROUS SULFATE TAB EC 324 MG (65 MG FE EQUIVALENT) 324 MG: 324 (65 FE) TABLET DELAYED RESPONSE at 09:15

## 2023-11-02 RX ADMIN — PRENATAL VITAMINS-IRON FUMARATE 27 MG IRON-FOLIC ACID 0.8 MG TABLET 1 TABLET: at 09:15

## 2023-11-02 RX ADMIN — IBUPROFEN 600 MG: 600 TABLET ORAL at 17:22

## 2023-11-02 RX ADMIN — IBUPROFEN 600 MG: 600 TABLET ORAL at 10:28

## 2023-11-02 RX ADMIN — DOCUSATE SODIUM 100 MG: 100 CAPSULE, LIQUID FILLED ORAL at 09:15

## 2023-11-02 RX ADMIN — HYDROCODONE BITARTRATE AND ACETAMINOPHEN 1 TABLET: 10; 325 TABLET ORAL at 18:54

## 2023-11-02 RX ADMIN — TETANUS TOXOID, REDUCED DIPHTHERIA TOXOID AND ACELLULAR PERTUSSIS VACCINE, ADSORBED 0.5 ML: 5; 2.5; 8; 8; 2.5 SUSPENSION INTRAMUSCULAR at 06:20

## 2023-11-02 RX ADMIN — FERROUS SULFATE TAB EC 324 MG (65 MG FE EQUIVALENT) 324 MG: 324 (65 FE) TABLET DELAYED RESPONSE at 17:22

## 2023-11-02 RX ADMIN — HYDROCODONE BITARTRATE AND ACETAMINOPHEN 1 TABLET: 5; 325 TABLET ORAL at 10:28

## 2023-11-02 NOTE — PROGRESS NOTES
Alfonso Mendieta  : 1995  MRN: [id number]  CSN: [id number]    Postpartum Day #1  Subjective   Her pain is well controlled.  Vaginal bleeding is appropriate amount. She is voiding without difficulty. She is bottle feeding.     Objective     Min/max vitals past 24 hours:   Temp  Min: 97.7 °F (36.5 °C)  Max: 98 °F (36.7 °C)  BP  Min: 113/73  Max: 135/81  Pulse  Min: 66  Max: 78  Resp  Min: 18  Max: 18        General: well developed; well nourished  no acute distress   Abdomen: fundus firm and non-tender   Pelvic: Not performed   Ext: Calves NT     Lab Results   Component Value Date    WBC 10.32 2023    HGB 10.2 (L) 2023    HCT 30.7 (L) 2023    MCV 94.5 2023     2023    ABO A 10/31/2023    RH Positive 10/31/2023    RUBELLAABIGG 1.59 2023    HEPBSAG Negative 2023        Assessment   Postpartum Day #1 S/P vaginal delivery  Anemia     Plan   Continue routine postpartum care  Ferrous Sulfate BID    This note was electronically signed  Suad Valdez, DON  2023  08:59 EDT

## 2023-11-02 NOTE — PLAN OF CARE
Goal Outcome Evaluation:            No s/s of distress.  Ambulating and voiding with no difficulties.  Denies dizziness.  IV discontinued.  Denies heavy vaginal bleeding/clotting.   Tolerating diet.

## 2023-11-03 VITALS
BODY MASS INDEX: 23.49 KG/M2 | HEIGHT: 67 IN | RESPIRATION RATE: 18 BRPM | DIASTOLIC BLOOD PRESSURE: 68 MMHG | OXYGEN SATURATION: 98 % | HEART RATE: 69 BPM | TEMPERATURE: 98 F | SYSTOLIC BLOOD PRESSURE: 104 MMHG

## 2023-11-03 PROCEDURE — 99238 HOSP IP/OBS DSCHRG MGMT 30/<: CPT | Performed by: MIDWIFE

## 2023-11-03 RX ORDER — IBUPROFEN 600 MG/1
600 TABLET ORAL EVERY 6 HOURS PRN
Qty: 60 TABLET | Refills: 0 | Status: SHIPPED | OUTPATIENT
Start: 2023-11-03

## 2023-11-03 RX ADMIN — FERROUS SULFATE TAB EC 324 MG (65 MG FE EQUIVALENT) 324 MG: 324 (65 FE) TABLET DELAYED RESPONSE at 09:52

## 2023-11-03 RX ADMIN — HYDROCODONE BITARTRATE AND ACETAMINOPHEN 1 TABLET: 10; 325 TABLET ORAL at 09:57

## 2023-11-03 RX ADMIN — IBUPROFEN 600 MG: 600 TABLET ORAL at 01:11

## 2023-11-03 RX ADMIN — PRENATAL VITAMINS-IRON FUMARATE 27 MG IRON-FOLIC ACID 0.8 MG TABLET 1 TABLET: at 09:52

## 2023-11-03 RX ADMIN — ACETAMINOPHEN 650 MG: 325 TABLET, FILM COATED ORAL at 05:58

## 2023-11-03 RX ADMIN — DOCUSATE SODIUM 100 MG: 100 CAPSULE, LIQUID FILLED ORAL at 09:52

## 2023-11-03 NOTE — NURSING NOTE
Boyfriend arrived to bedside. House Supervisor notified to notarize paternity papers at this time. Papers completed.

## 2023-11-03 NOTE — DISCHARGE SUMMARY
Discharge Summary     Alfonso Mendieta  : 1995  MRN: [id number]  CSN: [id number]    Date of Admission: 10/31/2023   Date of Discharge:  11/3/2023   Delivering Physician: Suad Valdez        Admission Diagnosis: Pregnancy [Z34.90]  Pregnancy [Z34.90]   Discharge Diagnosis: Same as above plus  Pregnancy at 39w1d - delivered       Procedures: 2023  - Vaginal, Spontaneous       Hospital Course  Patient is a 28 y.o.  who at 39w1d had a vaginal birth.  Her postpartum course was without complications.  On PPD #2 she was ready for discharge.  She had normal lochia and pain well controlled with oral medications. She is bottle feeding. She is planning tubal @ 6 weeks    Infant  male  fetus weighing 3083 g (6 lb 12.8 oz)   Apgars -  8 @ 1 minute /  9 @ 5 minutes.    Discharge labs  Lab Results   Component Value Date    WBC 10.32 2023    HGB 10.2 (L) 2023    HCT 30.7 (L) 2023     2023       Discharge Medications     Discharge Medications        New Medications        Instructions Start Date   ibuprofen 600 MG tablet  Commonly known as: ADVIL,MOTRIN   600 mg, Oral, Every 6 Hours PRN             Continue These Medications        Instructions Start Date   ferrous sulfate 325 (65 FE) MG tablet   325 mg, Oral, 2 Times Daily Before Meals      Prenatal 27-1 27-1 MG tablet tablet   1 tablet, Oral, Daily               Discharge Disposition Home or Self Care   Condition on Discharge: good   Follow-up: 6 weeks with CELINA Hamilton     Time spent on discharge: 30 minutes or less  DON Ackerman  11/3/2023

## 2023-11-03 NOTE — PLAN OF CARE
Goal Outcome Evaluation: VSS, no complaints, FF, lochia scant, expect dc today.

## 2023-11-03 NOTE — CASE MANAGEMENT/SOCIAL WORK
Case Management/Social Work    Patient Name:  Iza Mendieta  YOB: 1995  MRN: [id number]  Admit Date:  10/31/2023        Elissa met with MOB and FOB at bedside.  In regards to late entry to prenatal MOB states she called Dr. Collins office in April and requested an appointment.  Was unable to be seen by OBGYN until June.  Denies any transportation issues. Has a car seat to take baby home in and has signed up for WIC.  No further social work interventions necessary.  Sw will remove consult and baby can dc to home with mom.           Electronically signed by:  Yamini Torres  11/03/23 09:15 EDT

## 2023-11-03 NOTE — PAYOR COMM NOTE
"  D/c summary  Ur manager; sandy nichols [phone] and fax [phone]    Iza Mendieta \"Renée\" (28 y.o. Female)       Date of Birth   1995    Social Security Number       Address   [address] [address]    Home Phone   [phone]    MRN   [id number]       Confucianism   Mu-ism    Marital Status   Single                            Admission Date   10/31/23    Admission Type   Elective    Admitting Provider   Gabriel Hargrove MD    Attending Provider   Suad Valdez CNM    Department, Room/Bed   [facility], W205/1       Discharge Date       Discharge Disposition   Home or Self Care    Discharge Destination                                 Attending Provider: Suad Valdez CNM    Allergies: Amoxicillin, Penicillins    Isolation: None   Infection: None   Code Status: CPR    Ht: 170.2 cm (67.01\")   Wt: 68 kg (150 lb)    Admission Cmt: None   Principal Problem: Pregnancy [Z34.90]                   Active Insurance as of 10/31/2023       Primary Coverage       Payor Plan Insurance Group Employer/Plan Group    ANTHEM MEDICAID ANTH MEDICAID [id number]       Payor Plan Address Payor Plan Phone Number Payor Plan Fax Number Effective Dates    [address] [address] [phone]  7/1/2023 - None Entered    [facility] [id number]         Subscriber Name Subscriber Birth Date Member ID       IZA MENDIETA 1995 [id number]                     Emergency Contacts        (Rel.) Home Phone Work Phone Mobile Phone    SHELLI DORAN (Other) [phone] -- --              Physician Progress Notes (last 24 hours)  Notes from 11/02/23 1312 through 11/03/23 1312   No notes of this type exist for this encounter.          Discharge Summary        Suad Valdez CNM at 11/03/23 0927       Attestation signed by Gabriel Hargrove MD at 11/03/23 0958    I agree with the assessment and plan.    Gabriel Hargrove MD  Obstetrics and Gynecology  [facility] " [facility]                  Discharge Summary     Alfonso Mendieta  : 1995  MRN: [id number]  CSN: [id number]    Date of Admission: 10/31/2023   Date of Discharge:  11/3/2023   Delivering Physician: Suad Valdez        Admission Diagnosis: Pregnancy [Z34.90]  Pregnancy [Z34.90]   Discharge Diagnosis: Same as above plus  Pregnancy at 39w1d - delivered       Procedures: 2023  - Vaginal, Spontaneous       Hospital Course  Patient is a 28 y.o.  who at 39w1d had a vaginal birth.  Her postpartum course was without complications.  On PPD #2 she was ready for discharge.  She had normal lochia and pain well controlled with oral medications. She is bottle feeding. She is planning tubal @ 6 weeks    Infant  male  fetus weighing 3083 g (6 lb 12.8 oz)   Apgars -  8 @ 1 minute /  9 @ 5 minutes.    Discharge labs  Lab Results   Component Value Date    WBC 10.32 2023    HGB 10.2 (L) 2023    HCT 30.7 (L) 2023     2023       Discharge Medications     Discharge Medications        New Medications        Instructions Start Date   ibuprofen 600 MG tablet  Commonly known as: ADVIL,MOTRIN   600 mg, Oral, Every 6 Hours PRN             Continue These Medications        Instructions Start Date   ferrous sulfate 325 (65 FE) MG tablet   325 mg, Oral, 2 Times Daily Before Meals      Prenatal 27-1 27-1 MG tablet tablet   1 tablet, Oral, Daily               Discharge Disposition Home or Self Care   Condition on Discharge: good   Follow-up: 6 weeks with CELINA Hamilton     Time spent on discharge: 30 minutes or less  DON Ackerman  11/3/2023      Electronically signed by Gabriel Hargrove MD at 23 0958

## 2023-11-08 ENCOUNTER — TELEPHONE (OUTPATIENT)
Dept: OBSTETRICS AND GYNECOLOGY | Facility: CLINIC | Age: 28
End: 2023-11-08

## 2023-11-08 ENCOUNTER — PREP FOR SURGERY (OUTPATIENT)
Dept: OTHER | Facility: HOSPITAL | Age: 28
End: 2023-11-08
Payer: MEDICAID

## 2023-11-08 DIAGNOSIS — Z30.2 ENCOUNTER FOR STERILIZATION: Primary | ICD-10-CM

## 2023-11-08 RX ORDER — SODIUM CHLORIDE 9 MG/ML
40 INJECTION, SOLUTION INTRAVENOUS AS NEEDED
OUTPATIENT
Start: 2023-11-08

## 2023-11-08 RX ORDER — SODIUM CHLORIDE 0.9 % (FLUSH) 0.9 %
3 SYRINGE (ML) INJECTION EVERY 12 HOURS SCHEDULED
OUTPATIENT
Start: 2023-11-08

## 2023-11-08 RX ORDER — SODIUM CHLORIDE 0.9 % (FLUSH) 0.9 %
10 SYRINGE (ML) INJECTION AS NEEDED
OUTPATIENT
Start: 2023-11-08

## 2023-11-08 NOTE — TELEPHONE ENCOUNTER
"  Caller: Iza Menideta \"Renée\"    Relationship: Self    Best call back number: [phone]    What was the call regarding: PT RETURNING A MISSED CALL  "

## 2023-12-08 ENCOUNTER — PRE-ADMISSION TESTING (OUTPATIENT)
Dept: PREADMISSION TESTING | Facility: HOSPITAL | Age: 28
End: 2023-12-08
Payer: MEDICAID

## 2023-12-08 ENCOUNTER — POSTPARTUM VISIT (OUTPATIENT)
Dept: OBSTETRICS AND GYNECOLOGY | Facility: CLINIC | Age: 28
End: 2023-12-08
Payer: MEDICAID

## 2023-12-08 VITALS
BODY MASS INDEX: 21.36 KG/M2 | WEIGHT: 128.2 LBS | SYSTOLIC BLOOD PRESSURE: 122 MMHG | DIASTOLIC BLOOD PRESSURE: 60 MMHG | HEIGHT: 65 IN

## 2023-12-08 VITALS — WEIGHT: 129 LBS | HEIGHT: 65 IN | BODY MASS INDEX: 21.49 KG/M2

## 2023-12-08 DIAGNOSIS — Z12.4 SCREENING FOR CERVICAL CANCER: ICD-10-CM

## 2023-12-08 DIAGNOSIS — Z30.2 ENCOUNTER FOR STERILIZATION: ICD-10-CM

## 2023-12-08 PROBLEM — O23.599 TRICHOMONAL VAGINITIS DURING PREGNANCY: Status: RESOLVED | Noted: 2023-08-03 | Resolved: 2023-12-08

## 2023-12-08 PROBLEM — O98.819 CHLAMYDIA INFECTION AFFECTING PREGNANCY: Status: RESOLVED | Noted: 2023-07-31 | Resolved: 2023-12-08

## 2023-12-08 PROBLEM — Z34.90 PREGNANCY: Status: RESOLVED | Noted: 2023-10-31 | Resolved: 2023-12-08

## 2023-12-08 PROBLEM — A74.9 CHLAMYDIA INFECTION AFFECTING PREGNANCY: Status: RESOLVED | Noted: 2023-07-31 | Resolved: 2023-12-08

## 2023-12-08 PROBLEM — A59.01 TRICHOMONAL VAGINITIS DURING PREGNANCY: Status: RESOLVED | Noted: 2023-08-03 | Resolved: 2023-12-08

## 2023-12-08 PROBLEM — O09.30 LATE PRENATAL CARE: Status: RESOLVED | Noted: 2023-06-13 | Resolved: 2023-12-08

## 2023-12-08 LAB
B-HCG UR QL: NEGATIVE
BACTERIA UR QL AUTO: ABNORMAL /HPF
BASOPHILS # BLD AUTO: 0.04 10*3/MM3 (ref 0–0.2)
BASOPHILS NFR BLD AUTO: 0.8 % (ref 0–1.5)
BILIRUB UR QL STRIP: NEGATIVE
CLARITY UR: ABNORMAL
COLOR UR: YELLOW
DEPRECATED RDW RBC AUTO: 41.6 FL (ref 37–54)
EOSINOPHIL # BLD AUTO: 0.22 10*3/MM3 (ref 0–0.4)
EOSINOPHIL NFR BLD AUTO: 4.5 % (ref 0.3–6.2)
ERYTHROCYTE [DISTWIDTH] IN BLOOD BY AUTOMATED COUNT: 12.1 % (ref 12.3–15.4)
GLUCOSE UR STRIP-MCNC: NEGATIVE MG/DL
HCT VFR BLD AUTO: 39.2 % (ref 34–46.6)
HGB BLD-MCNC: 12.7 G/DL (ref 12–15.9)
HGB UR QL STRIP.AUTO: ABNORMAL
HYALINE CASTS UR QL AUTO: ABNORMAL /LPF
IMM GRANULOCYTES # BLD AUTO: 0.01 10*3/MM3 (ref 0–0.05)
IMM GRANULOCYTES NFR BLD AUTO: 0.2 % (ref 0–0.5)
KETONES UR QL STRIP: NEGATIVE
LEUKOCYTE ESTERASE UR QL STRIP.AUTO: ABNORMAL
LYMPHOCYTES # BLD AUTO: 1.88 10*3/MM3 (ref 0.7–3.1)
LYMPHOCYTES NFR BLD AUTO: 38.6 % (ref 19.6–45.3)
MCH RBC QN AUTO: 30.1 PG (ref 26.6–33)
MCHC RBC AUTO-ENTMCNC: 32.4 G/DL (ref 31.5–35.7)
MCV RBC AUTO: 92.9 FL (ref 79–97)
MONOCYTES # BLD AUTO: 0.39 10*3/MM3 (ref 0.1–0.9)
MONOCYTES NFR BLD AUTO: 8 % (ref 5–12)
NEUTROPHILS NFR BLD AUTO: 2.33 10*3/MM3 (ref 1.7–7)
NEUTROPHILS NFR BLD AUTO: 47.9 % (ref 42.7–76)
NITRITE UR QL STRIP: NEGATIVE
NRBC BLD AUTO-RTO: 0 /100 WBC (ref 0–0.2)
PH UR STRIP.AUTO: 5.5 [PH] (ref 5–8)
PLATELET # BLD AUTO: 276 10*3/MM3 (ref 140–450)
PMV BLD AUTO: 9.6 FL (ref 6–12)
PROT UR QL STRIP: NEGATIVE
RBC # BLD AUTO: 4.22 10*6/MM3 (ref 3.77–5.28)
RBC # UR STRIP: ABNORMAL /HPF
REF LAB TEST METHOD: ABNORMAL
SP GR UR STRIP: 1.03 (ref 1–1.03)
SQUAMOUS #/AREA URNS HPF: ABNORMAL /HPF
UROBILINOGEN UR QL STRIP: ABNORMAL
WBC # UR STRIP: ABNORMAL /HPF
WBC NRBC COR # BLD AUTO: 4.87 10*3/MM3 (ref 3.4–10.8)

## 2023-12-08 PROCEDURE — 85025 COMPLETE CBC W/AUTO DIFF WBC: CPT

## 2023-12-08 PROCEDURE — 81025 URINE PREGNANCY TEST: CPT

## 2023-12-08 PROCEDURE — 36415 COLL VENOUS BLD VENIPUNCTURE: CPT

## 2023-12-08 PROCEDURE — 87086 URINE CULTURE/COLONY COUNT: CPT

## 2023-12-08 PROCEDURE — 81001 URINALYSIS AUTO W/SCOPE: CPT

## 2023-12-08 NOTE — DISCHARGE INSTRUCTIONS
Pre-Admission testing appointment completed today for patient's upcoming procedure here at [facility].    PAT PASS reviewed with patient and they verbalize understanding of the following:     Do not eat or drink anything after midnight the night before procedure unless otherwise instructed by physician/surgeon's office, this includes no gum, candy, mints, tobacco products or e-cigarettes.  Do not shave the area to be operated on at least 48 hours prior to procedure.  Do not wear makeup, lotion, hair products, or nail polish.  Do not wear any jewelry and remove all piercings.  Do not wear any adhesive if you wear dentures.  Do not wear contacts; bring in glasses if needed.  Only take medications on the morning of procedure as instructed by PAT nurse per anesthesia guidelines or as instructed by physician's office.  If you are on any blood thinners reach out to the physician/surgeon's office for instructions on when/if they will need to be stopped prior to procedure.  Bring in picture ID and insurance card, advanced directive copies if applicable, CPAP/BIPAP/Inhalers if indicated morning of procedure, leave any other valuables at home.  Ensure you have arranged for someone to drive you home the day of your procedure and someone to care for you at home afterwards. It is recommended that you do not drive, drink alcohol, or make any major legal decisions for at least 24 hours after your procedure is complete.  Chlorhexadine wipes along with instruction/information sheet given to patient if indicated. Instructed patient to date, time, and initial the verification sheet once skin prep has been  completed, and to return to [facility] the day of the procedure.     Introduction to anesthesia video watched by patient during appointment.  Instructions and information given to patient about parking, hospital entrance, and registration location.

## 2023-12-08 NOTE — PROGRESS NOTES
"History  Chief Complaint   Patient presents with    Postpartum Care     6 week post partum.   PAP  No concerns         Iza Mendieta is a 28 y.o. year old  presenting to be seen for her postpartum visit.  She had a vaginal delivery.    Since delivery she has not been sexually active.   She does not have concerns about post-partum blues/depression.   She is bottle feeding.  For ongoing contraception, her plans are tubal ligation. It is scheduled for 23  She has not had a menstrual cycle.         Physical  /60   Ht 165.1 cm (65\")   Wt 58.2 kg (128 lb 3.2 oz)   BMI 21.33 kg/m²     General:  well developed; well nourished  no acute distress   Abdomen: soft, non-tender; no masses   Pelvis: External genitalia:  normal appearance of the external genitalia including Bartholin's and [place]'s glands.  Vaginal:  normal pink mucosa without prolapse or lesions. blood present -  small amount and dark red;  Cervix:  normal appearance.  Uterus:  normal size, shape and consistency.  Adnexa:  normal bimanual exam of the adnexa.        Assessment   Normal 6 week postpartum exam  Contraceptive management     Plan   BC options reviewed and compared today: tubal sterilization  Pap smear done  Follow up 1 year    No orders of the defined types were placed in this encounter.         This note was electronically signed.  DON Ordonez  2023  "

## 2023-12-09 LAB — BACTERIA SPEC AEROBE CULT: NO GROWTH

## 2023-12-11 ENCOUNTER — ANESTHESIA EVENT (OUTPATIENT)
Dept: PERIOP | Facility: HOSPITAL | Age: 28
End: 2023-12-11
Payer: MEDICAID

## 2023-12-12 ENCOUNTER — HOSPITAL ENCOUNTER (OUTPATIENT)
Facility: HOSPITAL | Age: 28
Setting detail: HOSPITAL OUTPATIENT SURGERY
Discharge: HOME | End: 2023-12-12
Attending: OBSTETRICS & GYNECOLOGY | Admitting: OBSTETRICS & GYNECOLOGY
Payer: MEDICAID

## 2023-12-12 ENCOUNTER — ANESTHESIA (OUTPATIENT)
Dept: PERIOP | Facility: HOSPITAL | Age: 28
End: 2023-12-12
Payer: MEDICAID

## 2023-12-12 VITALS
HEART RATE: 64 BPM | TEMPERATURE: 98.2 F | OXYGEN SATURATION: 100 % | DIASTOLIC BLOOD PRESSURE: 68 MMHG | RESPIRATION RATE: 16 BRPM | SYSTOLIC BLOOD PRESSURE: 101 MMHG

## 2023-12-12 DIAGNOSIS — Z98.51 S/P TUBAL LIGATION: Primary | ICD-10-CM

## 2023-12-12 DIAGNOSIS — Z30.2 ENCOUNTER FOR STERILIZATION: ICD-10-CM

## 2023-12-12 PROCEDURE — 25010000002 FENTANYL CITRATE PF 50 MCG/ML SOLUTION PREFILLED SYRINGE: Performed by: NURSE ANESTHETIST, CERTIFIED REGISTERED

## 2023-12-12 PROCEDURE — 25010000002 DEXAMETHASONE PER 1 MG: Performed by: NURSE ANESTHETIST, CERTIFIED REGISTERED

## 2023-12-12 PROCEDURE — 25810000003 LACTATED RINGERS PER 1000 ML: Performed by: OBSTETRICS & GYNECOLOGY

## 2023-12-12 PROCEDURE — 25010000002 HYDROMORPHONE 1 MG/ML SOLUTION: Performed by: NURSE ANESTHETIST, CERTIFIED REGISTERED

## 2023-12-12 PROCEDURE — 25010000002 SUGAMMADEX 200 MG/2ML SOLUTION: Performed by: NURSE ANESTHETIST, CERTIFIED REGISTERED

## 2023-12-12 PROCEDURE — 25010000002 PROPOFOL 10 MG/ML EMULSION: Performed by: NURSE ANESTHETIST, CERTIFIED REGISTERED

## 2023-12-12 PROCEDURE — 25010000002 ONDANSETRON PER 1 MG: Performed by: NURSE ANESTHETIST, CERTIFIED REGISTERED

## 2023-12-12 RX ORDER — ROCURONIUM BROMIDE 50 MG/5 ML
SYRINGE (ML) INTRAVENOUS AS NEEDED
Status: DISCONTINUED | OUTPATIENT
Start: 2023-12-12 | End: 2023-12-12 | Stop reason: SURG

## 2023-12-12 RX ORDER — OXYCODONE HYDROCHLORIDE 5 MG/1
5 TABLET ORAL EVERY 6 HOURS PRN
Qty: 5 TABLET | Refills: 0 | Status: SHIPPED | OUTPATIENT
Start: 2023-12-12

## 2023-12-12 RX ORDER — ACETAMINOPHEN 500 MG
1000 TABLET ORAL EVERY 8 HOURS PRN
Qty: 60 TABLET | Refills: 0 | Status: SHIPPED | OUTPATIENT
Start: 2023-12-12 | End: 2024-12-11

## 2023-12-12 RX ORDER — IBUPROFEN 800 MG/1
800 TABLET ORAL EVERY 8 HOURS PRN
Qty: 30 TABLET | Refills: 0 | Status: SHIPPED | OUTPATIENT
Start: 2023-12-12

## 2023-12-12 RX ORDER — SODIUM CHLORIDE 0.9 % (FLUSH) 0.9 %
10 SYRINGE (ML) INJECTION AS NEEDED
Status: DISCONTINUED | OUTPATIENT
Start: 2023-12-12 | End: 2023-12-12 | Stop reason: HOSPADM

## 2023-12-12 RX ORDER — FENTANYL CITRATE 50 UG/ML
INJECTION, SOLUTION INTRAMUSCULAR; INTRAVENOUS AS NEEDED
Status: DISCONTINUED | OUTPATIENT
Start: 2023-12-12 | End: 2023-12-12 | Stop reason: SURG

## 2023-12-12 RX ORDER — PROPOFOL 10 MG/ML
VIAL (ML) INTRAVENOUS AS NEEDED
Status: DISCONTINUED | OUTPATIENT
Start: 2023-12-12 | End: 2023-12-12 | Stop reason: SURG

## 2023-12-12 RX ORDER — ONDANSETRON 2 MG/ML
INJECTION INTRAMUSCULAR; INTRAVENOUS AS NEEDED
Status: DISCONTINUED | OUTPATIENT
Start: 2023-12-12 | End: 2023-12-12 | Stop reason: SURG

## 2023-12-12 RX ORDER — SODIUM CHLORIDE 0.9 % (FLUSH) 0.9 %
3 SYRINGE (ML) INJECTION EVERY 12 HOURS SCHEDULED
Status: DISCONTINUED | OUTPATIENT
Start: 2023-12-12 | End: 2023-12-12 | Stop reason: HOSPADM

## 2023-12-12 RX ORDER — ONDANSETRON 2 MG/ML
4 INJECTION INTRAMUSCULAR; INTRAVENOUS ONCE AS NEEDED
Status: DISCONTINUED | OUTPATIENT
Start: 2023-12-12 | End: 2023-12-12 | Stop reason: HOSPADM

## 2023-12-12 RX ORDER — HYDROCODONE BITARTRATE AND ACETAMINOPHEN 5; 325 MG/1; MG/1
1 TABLET ORAL ONCE AS NEEDED
Status: DISCONTINUED | OUTPATIENT
Start: 2023-12-12 | End: 2023-12-12 | Stop reason: HOSPADM

## 2023-12-12 RX ORDER — DEXAMETHASONE SODIUM PHOSPHATE 4 MG/ML
INJECTION, SOLUTION INTRA-ARTICULAR; INTRALESIONAL; INTRAMUSCULAR; INTRAVENOUS; SOFT TISSUE AS NEEDED
Status: DISCONTINUED | OUTPATIENT
Start: 2023-12-12 | End: 2023-12-12 | Stop reason: SURG

## 2023-12-12 RX ORDER — IBUPROFEN 600 MG/1
600 TABLET ORAL EVERY 6 HOURS PRN
Status: DISCONTINUED | OUTPATIENT
Start: 2023-12-12 | End: 2023-12-12 | Stop reason: HOSPADM

## 2023-12-12 RX ORDER — SODIUM CHLORIDE, SODIUM LACTATE, POTASSIUM CHLORIDE, CALCIUM CHLORIDE 600; 310; 30; 20 MG/100ML; MG/100ML; MG/100ML; MG/100ML
1000 INJECTION, SOLUTION INTRAVENOUS CONTINUOUS
Status: DISCONTINUED | OUTPATIENT
Start: 2023-12-12 | End: 2023-12-12 | Stop reason: HOSPADM

## 2023-12-12 RX ORDER — SODIUM CHLORIDE 9 MG/ML
40 INJECTION, SOLUTION INTRAVENOUS AS NEEDED
Status: DISCONTINUED | OUTPATIENT
Start: 2023-12-12 | End: 2023-12-12 | Stop reason: HOSPADM

## 2023-12-12 RX ADMIN — FENTANYL CITRATE 100 MCG: 50 INJECTION, SOLUTION INTRAMUSCULAR; INTRAVENOUS at 10:19

## 2023-12-12 RX ADMIN — Medication 30 MG: at 10:19

## 2023-12-12 RX ADMIN — LIDOCAINE HYDROCHLORIDE 80 MG: 20 INJECTION, SOLUTION INTRAVENOUS at 10:19

## 2023-12-12 RX ADMIN — SODIUM CHLORIDE, POTASSIUM CHLORIDE, SODIUM LACTATE AND CALCIUM CHLORIDE 1000 ML: 600; 310; 30; 20 INJECTION, SOLUTION INTRAVENOUS at 07:52

## 2023-12-12 RX ADMIN — PROPOFOL 160 MG: 10 INJECTION, EMULSION INTRAVENOUS at 10:19

## 2023-12-12 RX ADMIN — HYDROMORPHONE HYDROCHLORIDE 0.5 MG: 1 INJECTION, SOLUTION INTRAMUSCULAR; INTRAVENOUS; SUBCUTANEOUS at 11:18

## 2023-12-12 RX ADMIN — SUGAMMADEX 120 MG: 100 INJECTION, SOLUTION INTRAVENOUS at 10:51

## 2023-12-12 RX ADMIN — ONDANSETRON 4 MG: 2 INJECTION INTRAMUSCULAR; INTRAVENOUS at 10:51

## 2023-12-12 RX ADMIN — DEXAMETHASONE SODIUM PHOSPHATE 8 MG: 4 INJECTION, SOLUTION INTRA-ARTICULAR; INTRALESIONAL; INTRAMUSCULAR; INTRAVENOUS; SOFT TISSUE at 10:19

## 2023-12-12 NOTE — ANESTHESIA PROCEDURE NOTES
Airway  Urgency: elective    Date/Time: 12/12/2023 10:21 AM  Airway not difficult    General Information and Staff    Patient location during procedure: OR  CRNA/CAA: Yandel Venutra CRNA    Indications and Patient Condition  Indications for airway management: airway protection    Preoxygenated: yes  Mask difficulty assessment: 1 - vent by mask    Final Airway Details  Final airway type: endotracheal airway      Successful airway: ETT  Cuffed: yes   Successful intubation technique: direct laryngoscopy  Endotracheal tube insertion site: oral  Blade: Dumont  Blade size: 2  ETT size (mm): 7.0  Cormack-Lehane Classification: grade I - full view of glottis  Placement verified by: chest auscultation and capnometry   Measured from: lips  ETT/EBT  to lips (cm): 21  Number of attempts at approach: 1  Assessment: lips, teeth, and gum same as pre-op and atraumatic intubation

## 2023-12-12 NOTE — OP NOTE
Alfonso Mendieta  : [id number]  CSN: [id number]  Date: 2023    Operative Note    Pre-op Diagnosis:  Desires permanent sterilization    Post-op Diagnosis:  Same    Procedure: Laparoscopic bilateral tubal ligation via fulguration    Surgeon:  Surgical assistant:  OR Staff: ALE Bro was responsible for performing the following activities: Retraction, Placing Dressing, and manipulation of laparoscopic instruments  and their skilled assistance was necessary for the success of this case.    Circulator: Bo Rob RN; Myra Dumont RN  Scrub Person: Fern Harris   Anesthesia: General endotracheal anethesia   Specimens:   Complications: None  None       Indications:   Patient is a 28 y.o. year old  who desires sterilization. We discussed risks/ benefits/alternatives to permanent sterilization including alternative LARC methods. Risks include, but aren't limited to regret, bleeding, infection, damage to surrounding organs, <1% failure rate, and high risk of ectopic pregnancy in setting of failure. Pt instructed to seek MD if she should ever think that she is pregnant in the future.    Procedure:   After the appropriate time out and adequate dosing of her anesthesia, the patient was prepped and draped in the usual sterile fashion.  A rosas catheter was placed in the bladder for drainage during the procedure.  She was placed in the dorsal lithotomy position using Roman stirrups. A sponge stick was placed in the vagina for uterine manipulation. A 5 mm infraumbilical skin incision was made with a scalpel. A 5 mm trocar was inserted under direct without any complications. The abdomen was insufflated with CO2 making sure to keep the pressure less than 15 mmHg. The patient was placed in the Trendelenburg position. An ancillary 5 mm trocar was placed in the left lower quadrant lateral to the inferior epigastric vessels. Both trocar sites were noted  to be atraumatic. The pelvis was explored with normal anatomy noted. The left fallopian tube was identified by its fimbriated end. The bipolar Kleppinger device was used to cauterize the length of the fallopian tube. Adequate blanching was noted throughout. The laparoscopic scissors were then used to transect the tube in 4 separate sites along the cauterized portion. The surgical site was noted to be hemostatic. This same procedure was performed on the contralateral tube. Repeat inspection revealed adequate hemostasis at all surgical sites. The lateral trocar was removed under direct visualization and the pneumoperitoneum was released. The umbilical trocar was then removed and both skin incisions were noted to be hemostatic. These sites were closed with 3-0 Monocryl suture in a subcuticular fashion. The uterine manipulator was removed with sufficient hemostasis. The patient tolerated the procedure well. There were no complications. All instruments and sponge counts were correct at the end of the procedure. She was taken to postoperative recovery room in stable condition.    Gabriel Hargrove MD  Obstetrics and Gynecology  [facility]

## 2023-12-12 NOTE — DISCHARGE INSTRUCTIONS
Pelvic Surgery  Home Care Instructions:  Dr. Gabriel Hargrove      Thank you for choosing [facility]. Please let us know if you have questions or concerns or do not understand the information we give you. Always ask us to explain words or phrases you do not understand.    You have had pelvic surgery. Here are some basic guidelines to help you recover more quickly at home. Your doctor may give you more guidelines. If you have any questions after you go home, please call the numbers listed on the next page.    General Guidelines    1. You may resume normal daily activities.  2. Do not put anything in the vagina (no tampons or douching).    3.   Do not have sex until cleared by your physician.  4.   You may climb steps.  5. You may bathe or shower.  6. The only exercise you should do is walking. This is important for your recovery.  7. Do not drive while taking narcotics.  8. Take the medicines you were taking before surgery. Other medicines you need to take at home are:    Alternate Motrin and Tylenol around the clock. Take each every 8 hours in alternation so that you are getting one of the medications every 4 hours.   Roxicodone 5mg tab  - One tablet by mouth every 4-6 hours as needed for breakthrough pain   Metamucil + Colace daily to keep bowel movements soft        If you have questions about your medicines, let us know. Or, talk to your local pharmacist.    Surgery, lack of activity, pain medicines and iron pills tend to cause constipation. Take something every day to prevent constipation and straining.  Taking something like Metamucil® every day to increase fiber may prevent constipation. Follow the instructions on the container. If you need a gentle laxative, then something like Milk of Magnesia® or Correctol® work fairly well. You should not need to take laxatives often.    10. Call your doctor if you have:     a. Fever of 101 degrees or higher.  b. Heavy vaginal bleeding.  c. Increased redness  around your incision (cut); incision pulling apart; drainage (pus), bleeding, or a large amount of fluid from your incision.  d. Worsening nausea or pain.  e. Other problems or concern.    If you have any questions or concerns about your usual routines, please talk to the nurse or doctor.       To talk with your doctor at [facility], call:  [facility]necology Outpatient [facility]  Phone: [phone]      We appreciate the opportunity you have given us to participate in your care.       No pushing, pulling, tugging,  heavy lifting, or strenuous activity.  No major decision making, driving, or drinking alcoholic beverages for 24 hours. ( due to the medications you have  received)  Always use good hand hygiene/washing techniques.  NO driving while taking pain medications.    * if you have an incision:  Check your incision area every day for signs of infection.   Check for:  * more redness, swelling, or pain  *more fluid or blood  *warmth  *pus or bad smellTo assist you in voiding:  Drink plenty of fluids  Listen to running water while attempting to void.    If you are unable to urinate and you have an uncomfortable urge to void or it has been   6 hours since you were discharged, return to the Emergency RoomPelvic rest is best described as not putting anything in your vagina. This includes tampons, douching, tub bathing or sexual activity.

## 2023-12-12 NOTE — ANESTHESIA POSTPROCEDURE EVALUATION
Patient: Iza Mendieta    Procedure Summary       Date: 12/12/23 Room / Location: [facility] OR 1 /  [facility] OR    Anesthesia Start: 1014 Anesthesia Stop: 1105    Procedure: LAPAROSCOPIC TUBAL LIGATION (Abdomen) Diagnosis:       Encounter for sterilization      (Encounter for sterilization [Z30.2])    Surgeons: Gabriel Hargrove MD Provider: Yandel Ventura CRNA    Anesthesia Type: general ASA Status: 2            Anesthesia Type: general    Vitals  Vitals Value Taken Time   /75 12/12/23 1135   Temp     Pulse 50 12/12/23 1137   Resp     SpO2 97 % 12/12/23 1137   Vitals shown include unfiled device data.        Post Anesthesia Care and Evaluation    Patient location during evaluation: PACU  Patient participation: complete - patient participated  Level of consciousness: awake and alert  Pain management: adequate    Airway patency: patent  Anesthetic complications: No anesthetic complications  PONV Status: none  Cardiovascular status: acceptable  Respiratory status: acceptable  Hydration status: acceptable  No anesthesia care post op

## 2023-12-12 NOTE — H&P
GYNECOLOGY HISTORY AND PHYSICAL    CHIEF COMPLAINT: Scheduled surgery    DIAGNOSIS:  Desires permanent sterilization    ASSESSMENT/PLAN     28 y.o.  female who presents for scheduled laparoscopic bilateral tubal ligation via fulguration and all other indicated procedures.    - Proceed to the OR for scheduled surgery  - Risks for the procedure reviewed  - SCDs for DVT ppx  - Antibiotics: none    HISTORY OF PRESENT ILLNESS     28 y.o.  female who presents for the scheduled procedure listed above.    She has no complaints today and there are no interval changes to the history.    REVIEW OF SYSTEMS: A complete review of systems was performed and was specifically negative for headache, changes in vision, RUQ pain, shortness of breath, chest pain, lower extremity edema and dysuria.     HISTORY:  Obstetrical History:  OB History    Para Term  AB Living   2 2 2     2   SAB IAB Ectopic Molar Multiple Live Births           0 2      # Outcome Date GA Lbr Federico/2nd Weight Sex Delivery Anes PTL Lv   2 Term 23 39w1d / 00:58 3083 g (6 lb 12.8 oz) M Vag-Spont EPI N RAKESH   1 Term 13 39w0d  3260 g (7 lb 3 oz) M Vag-Spont EPI  RAKESH       Past Medical History:  Past Medical History:   Diagnosis Date    Chlamydia     Urogenital trichomoniasis        Past Surgical History:  Past Surgical History:   Procedure Laterality Date    NO PAST SURGERIES      WISDOM TOOTH EXTRACTION         Social History:  Social History     Tobacco Use    Smoking status: Former     Packs/day: 0.50     Years: 5.00     Additional pack years: 0.00     Total pack years: 2.50     Types: Cigarettes     Quit date: 2023     Years since quittin.6    Smokeless tobacco: Former   Vaping Use    Vaping Use: Never used   Substance Use Topics    Alcohol use: Not Currently    Drug use: Not Currently     Types: Marijuana       Family History  History reviewed. No pertinent family history.     Allergies:   Allergies   Allergen Reactions     Amoxicillin Anaphylaxis    Penicillins Anaphylaxis       OBJECTIVE   VITALS:  Temp:  [97.2 °F (36.2 °C)] 97.2 °F (36.2 °C)  Heart Rate:  [69] 69  Resp:  [17] 17  BP: (101)/(73) 101/73    PHYSICAL EXAM:  GENERAL: NAD, alert  CHEST: No increased work of breathing, CTAB  CV: RRR, WWP  ABDOMEN: Soft, NTTP  EXTREMITIES:  Warm and well-perfused, nontender, nonedematous  NEURO: AAO x 3, CN II-XII grossly intact    No current facility-administered medications on file prior to encounter.     No current outpatient medications on file prior to encounter.       DIAGNOSTIC STUDIES:  Results from last 7 days   Lab Units 12/08/23  1020   WBC 10*3/mm3 4.87   HEMOGLOBIN g/dL 12.7   HEMATOCRIT % 39.2   PLATELETS 10*3/mm3 276       No results found for this or any previous visit (from the past 24 hour(s)).    Gabriel Hargrove MD  Obstetrics and Gynecology  [facility]

## 2023-12-12 NOTE — ANESTHESIA PREPROCEDURE EVALUATION
Anesthesia Evaluation     Patient summary reviewed and Nursing notes reviewed   no history of anesthetic complications:   NPO Solid Status: > 8 hours  NPO Liquid Status: > 8 hours           Airway   Mallampati: II  TM distance: >3 FB  Neck ROM: full  no difficulty expected and No difficulty expected  Dental    (+) poor dentition    Pulmonary - negative pulmonary ROS and normal exam   (+) a smoker Former,  Cardiovascular - negative cardio ROS and normal exam    Rhythm: regular  Rate: normal        Neuro/Psych- negative ROS  GI/Hepatic/Renal/Endo - negative ROS     Musculoskeletal (-) negative ROS    Abdominal    Substance History - negative use     OB/GYN negative ob/gyn ROS         Other - negative ROS                   Anesthesia Plan    ASA 2     general     intravenous induction     Anesthetic plan, risks, benefits, and alternatives have been provided, discussed and informed consent has been obtained with: patient.  Pre-procedure education provided  Plan discussed with CRNA.    CODE STATUS:    Level Of Support Discussed With: Patient  Code Status (Patient has no pulse and is not breathing): CPR (Attempt to Resuscitate)  Medical Interventions (Patient has pulse or is breathing): Full Support

## 2023-12-14 LAB — REF LAB TEST METHOD: NORMAL

## 2023-12-20 ENCOUNTER — OFFICE VISIT (OUTPATIENT)
Dept: OBSTETRICS AND GYNECOLOGY | Facility: CLINIC | Age: 28
End: 2023-12-20
Payer: MEDICAID

## 2023-12-20 VITALS
WEIGHT: 125 LBS | BODY MASS INDEX: 20.83 KG/M2 | SYSTOLIC BLOOD PRESSURE: 126 MMHG | DIASTOLIC BLOOD PRESSURE: 80 MMHG | HEIGHT: 65 IN

## 2023-12-20 DIAGNOSIS — Z98.51 S/P TUBAL LIGATION: Primary | ICD-10-CM

## 2023-12-20 PROCEDURE — 99024 POSTOP FOLLOW-UP VISIT: CPT | Performed by: OBSTETRICS & GYNECOLOGY

## 2023-12-22 NOTE — PROGRESS NOTES
"Postoperative Assessment Visit    Subjective   Chief Complaint   Patient presents with    Post-op     8 days post op tubal, no complaints.       28 y.o.  female who presents for a post-op visit.    Pre-op Diagnosis:  Desires permanent sterilization    Post-op Diagnosis:  Same    Procedure: Laparoscopic bilateral tubal ligation via fulguration      The patient is doing well with no issues.     Objective   Vitals:    23 1337   BP: 126/80   Weight: 56.7 kg (125 lb)   Height: 165.1 cm (65\")     Physical Exam:  Incision(s): Well-healing, no signs of infection or separation  Reassuring postoperative exam       Medical Decision Making:    I have reviewed the operative note.  I have reviewed the postoperative labs where appropriate.    Assessment   BTL  Post-op evaluation     Plan    No orders of the defined types were placed in this encounter.      Medication Management: none    Patient is meeting all post-op milestones.  Pathology reviewed  Surgical findings discussed.  Postoperative precautions and restrictions reviewed.    RTC for annual visits.    Gabriel Hargrove MD  Obstetrics and Gynecology  [facility]    "

## 2024-08-01 ENCOUNTER — HOSPITAL ENCOUNTER (EMERGENCY)
Facility: HOSPITAL | Age: 29
Discharge: LEFT BEFORE DISCHARGE | End: 2024-08-01
Attending: STUDENT IN AN ORGANIZED HEALTH CARE EDUCATION/TRAINING PROGRAM
Payer: MEDICAID

## 2024-08-01 VITALS
DIASTOLIC BLOOD PRESSURE: 60 MMHG | HEIGHT: 65 IN | TEMPERATURE: 97.9 F | BODY MASS INDEX: 18.99 KG/M2 | SYSTOLIC BLOOD PRESSURE: 94 MMHG | OXYGEN SATURATION: 100 % | WEIGHT: 114 LBS | RESPIRATION RATE: 18 BRPM | HEART RATE: 71 BPM

## 2024-08-01 PROCEDURE — 99211 OFF/OP EST MAY X REQ PHY/QHP: CPT | Performed by: STUDENT IN AN ORGANIZED HEALTH CARE EDUCATION/TRAINING PROGRAM

## 2024-08-24 ENCOUNTER — HOSPITAL ENCOUNTER (EMERGENCY)
Facility: HOSPITAL | Age: 29
Discharge: HOME | End: 2024-08-24
Attending: STUDENT IN AN ORGANIZED HEALTH CARE EDUCATION/TRAINING PROGRAM
Payer: MEDICAID

## 2024-08-24 VITALS
BODY MASS INDEX: 18.99 KG/M2 | HEART RATE: 85 BPM | SYSTOLIC BLOOD PRESSURE: 113 MMHG | TEMPERATURE: 98.4 F | DIASTOLIC BLOOD PRESSURE: 76 MMHG | HEIGHT: 65 IN | RESPIRATION RATE: 18 BRPM | WEIGHT: 114 LBS | OXYGEN SATURATION: 98 %

## 2024-08-24 DIAGNOSIS — S46.811A STRAIN OF RIGHT TRAPEZIUS MUSCLE, INITIAL ENCOUNTER: Primary | ICD-10-CM

## 2024-08-24 PROCEDURE — 96372 THER/PROPH/DIAG INJ SC/IM: CPT

## 2024-08-24 PROCEDURE — 99283 EMERGENCY DEPT VISIT LOW MDM: CPT

## 2024-08-24 PROCEDURE — 25010000002 DEXAMETHASONE SODIUM PHOSPHATE 10 MG/ML SOLUTION: Performed by: NURSE PRACTITIONER

## 2024-08-24 PROCEDURE — 25010000002 KETOROLAC TROMETHAMINE PER 15 MG: Performed by: NURSE PRACTITIONER

## 2024-08-24 RX ORDER — METHOCARBAMOL 750 MG/1
750 TABLET, FILM COATED ORAL ONCE
Status: COMPLETED | OUTPATIENT
Start: 2024-08-24 | End: 2024-08-24

## 2024-08-24 RX ORDER — DEXAMETHASONE SODIUM PHOSPHATE 10 MG/ML
10 INJECTION, SOLUTION INTRAMUSCULAR; INTRAVENOUS ONCE
Status: COMPLETED | OUTPATIENT
Start: 2024-08-24 | End: 2024-08-24

## 2024-08-24 RX ORDER — METHOCARBAMOL 750 MG/1
750 TABLET, FILM COATED ORAL 3 TIMES DAILY PRN
Qty: 30 TABLET | Refills: 0 | Status: SHIPPED | OUTPATIENT
Start: 2024-08-24 | End: 2024-09-03

## 2024-08-24 RX ORDER — KETOROLAC TROMETHAMINE 30 MG/ML
30 INJECTION, SOLUTION INTRAMUSCULAR; INTRAVENOUS ONCE
Status: COMPLETED | OUTPATIENT
Start: 2024-08-24 | End: 2024-08-24

## 2024-08-24 RX ADMIN — METHOCARBAMOL 750 MG: 750 TABLET ORAL at 18:23

## 2024-08-24 RX ADMIN — DEXAMETHASONE SODIUM PHOSPHATE 10 MG: 10 INJECTION INTRAMUSCULAR; INTRAVENOUS at 18:25

## 2024-08-24 RX ADMIN — KETOROLAC TROMETHAMINE 30 MG: 30 INJECTION, SOLUTION INTRAMUSCULAR at 18:25

## 2024-08-24 NOTE — ED PROVIDER NOTES
Subjective:  History of Present Illness:    Patient is a 29-year-old female without contributing health history.  Presents to the ER today for right sided neck pain since yesterday evening.  She denies any injury.  Reports that she is able to touch chin to chest.  Reports decreased range of motion neck when turning to the right.  Denies fever.  Denies OTC medication or home remedy.  Denies alleviating or exacerbating factors.    Nurses Notes reviewed and agree, including vitals, allergies, social history and prior medical history.     REVIEW OF SYSTEMS: All systems reviewed and not pertinent unless noted.  Review of Systems   Musculoskeletal:  Positive for neck pain.   All other systems reviewed and are negative.      Past Medical History:   Diagnosis Date    Chlamydia     Urogenital trichomoniasis        Allergies:    Amoxicillin and Penicillins      Past Surgical History:   Procedure Laterality Date    NO PAST SURGERIES      TUBAL COAGULATION LAPAROSCOPIC N/A 2023    Procedure: LAPAROSCOPIC TUBAL LIGATION;  Surgeon: Gabriel Hargrove MD;  Location: [facility];  Service: Obstetrics/Gynecology;  Laterality: N/A;    WISDOM TOOTH EXTRACTION           Social History     Socioeconomic History    Marital status: Single   Tobacco Use    Smoking status: Former     Current packs/day: 0.00     Average packs/day: 0.5 packs/day for 5.0 years (2.5 ttl pk-yrs)     Types: Cigarettes     Start date: 2018     Quit date: 2023     Years since quittin.3    Smokeless tobacco: Former   Vaping Use    Vaping status: Never Used   Substance and Sexual Activity    Alcohol use: Not Currently    Drug use: Not Currently     Types: Marijuana    Sexual activity: Not Currently     Partners: Male     Birth control/protection: Nexplanon     Comment: nexplanon still present since          History reviewed. No pertinent family history.    Objective  Physical Exam:  /76   Pulse 85   Temp 98.4 °F (36.9 °C) (Oral)    "Resp 18   Ht 165.1 cm (65\")   Wt 51.7 kg (114 lb)   SpO2 98%   BMI 18.97 kg/m²      Physical Exam  Vitals and nursing note reviewed.   Constitutional:       Appearance: Normal appearance. She is normal weight.   HENT:      Head: Normocephalic and atraumatic.      Nose: Nose normal.      Mouth/Throat:      Mouth: Mucous membranes are moist.   Eyes:      Extraocular Movements: Extraocular movements intact.      Pupils: Pupils are equal, round, and reactive to light.   Cardiovascular:      Rate and Rhythm: Normal rate and regular rhythm.   Pulmonary:      Effort: Pulmonary effort is normal.      Breath sounds: Normal breath sounds.   Abdominal:      General: Abdomen is flat. Bowel sounds are normal.      Palpations: Abdomen is soft.   Musculoskeletal:         General: Normal range of motion.      Cervical back: Normal range of motion and neck supple. Tenderness present.   Skin:     General: Skin is warm and dry.      Capillary Refill: Capillary refill takes less than 2 seconds.   Neurological:      General: No focal deficit present.      Mental Status: She is alert and oriented to person, place, and time. Mental status is at baseline.   Psychiatric:         Mood and Affect: Mood normal.         Behavior: Behavior normal.         Thought Content: Thought content normal.         Judgment: Judgment normal.         Procedures    ED Course:         Lab Results (last 24 hours)       ** No results found for the last 24 hours. **             No radiology results from the last 24 hrs       MDM      Initial impression of presenting illness:   Patient is a 29-year-old female without contributing health history.  Presents to the ER today for right sided neck pain since yesterday evening.  She denies any injury.  Reports that she is able to touch chin to chest.  Reports decreased range of motion neck when turning to the right.  Denies fever.  Denies OTC medication or home remedy.  Denies alleviating or exacerbating " factors.    DDX: includes but is not limited to: Strain, sprain, torticollis    Patient arrives stable with vitals interpreted by myself.     Pertinent features from physical exam: Patient reports pain with right lateral rotation of neck.  Is able to touch chin to chest.  Tenderness noted with palpation of trapezius muscle otherwise physical assessment is unremarkable..    Initial diagnostic plan: N/A    Results from initial plan were reviewed and interpreted by me revealing N/A    Diagnostic information from other sources: Heart review    Interventions / Re-evaluation: Vital signs stable throughout encounter.  Patient received 10 mg dexamethasone, 30 mg of Toradol, and 750 of methocarbamol..  Patient reports pain is improved.    Results/clinical rationale were discussed with patient    Consultations/Discussion of results with other physicians: N/A    Disposition plan: Patient is hemodynamically stable nontoxic-appearing appropriate discharge.  Will have patient follow-up PCP within the week.  Follow-up ER for new or worse symptoms.  -----        Final diagnoses:   Strain of right trapezius muscle, initial encounter          Clarence Gautam, APRN  08/24/24 2003

## 2024-12-11 ENCOUNTER — TELEPHONE (OUTPATIENT)
Dept: OBSTETRICS AND GYNECOLOGY | Facility: CLINIC | Age: 29
End: 2024-12-11

## 2024-12-11 NOTE — TELEPHONE ENCOUNTER
"Caller: Iza Mendieta \"Renée\"    Relationship:  Self    Best call back number: [phone]    PATIENT CALLED REQUESTING TO CANCEL SAME DAY APPT.    Did the patient call AFTER the start time of their scheduled appointment?  []YES  [x]NO    Was the patient's appointment rescheduled? [x]YES  []NO    Any additional information: PT STARTED CYCLE.  "

## 2025-01-23 ENCOUNTER — OFFICE VISIT (OUTPATIENT)
Dept: OBSTETRICS AND GYNECOLOGY | Facility: CLINIC | Age: 30
End: 2025-01-23
Payer: MEDICAID

## 2025-01-23 VITALS
WEIGHT: 116 LBS | DIASTOLIC BLOOD PRESSURE: 68 MMHG | HEIGHT: 65 IN | BODY MASS INDEX: 19.33 KG/M2 | SYSTOLIC BLOOD PRESSURE: 108 MMHG

## 2025-01-23 DIAGNOSIS — Z12.4 ENCOUNTER FOR SCREENING FOR CERVICAL CANCER: ICD-10-CM

## 2025-01-23 DIAGNOSIS — Z98.51 S/P TUBAL LIGATION: ICD-10-CM

## 2025-01-23 DIAGNOSIS — D50.0 CHRONIC BLOOD LOSS ANEMIA: ICD-10-CM

## 2025-01-23 DIAGNOSIS — N93.9 ABNORMAL UTERINE BLEEDING (AUB): Primary | ICD-10-CM

## 2025-01-23 PROCEDURE — 99214 OFFICE O/P EST MOD 30 MIN: CPT | Performed by: OBSTETRICS & GYNECOLOGY

## 2025-01-23 RX ORDER — NORETHINDRONE 5 MG/1
5 TABLET ORAL DAILY
Qty: 30 TABLET | Refills: 5 | Status: SHIPPED | OUTPATIENT
Start: 2025-01-23

## 2025-02-02 ENCOUNTER — APPOINTMENT (OUTPATIENT)
Dept: GENERAL RADIOLOGY | Facility: HOSPITAL | Age: 30
End: 2025-02-02
Payer: MEDICAID

## 2025-02-02 ENCOUNTER — HOSPITAL ENCOUNTER (EMERGENCY)
Facility: HOSPITAL | Age: 30
Discharge: HOME | End: 2025-02-02
Attending: STUDENT IN AN ORGANIZED HEALTH CARE EDUCATION/TRAINING PROGRAM | Admitting: STUDENT IN AN ORGANIZED HEALTH CARE EDUCATION/TRAINING PROGRAM
Payer: MEDICAID

## 2025-02-02 VITALS
DIASTOLIC BLOOD PRESSURE: 74 MMHG | OXYGEN SATURATION: 100 % | WEIGHT: 116 LBS | HEART RATE: 55 BPM | RESPIRATION RATE: 14 BRPM | HEIGHT: 65 IN | TEMPERATURE: 97.8 F | SYSTOLIC BLOOD PRESSURE: 112 MMHG | BODY MASS INDEX: 19.33 KG/M2

## 2025-02-02 DIAGNOSIS — S90.122A CONTUSION OF LESSER TOE OF LEFT FOOT WITHOUT DAMAGE TO NAIL, INITIAL ENCOUNTER: Primary | ICD-10-CM

## 2025-02-02 PROCEDURE — 73660 X-RAY EXAM OF TOE(S): CPT

## 2025-02-02 PROCEDURE — 99283 EMERGENCY DEPT VISIT LOW MDM: CPT | Performed by: STUDENT IN AN ORGANIZED HEALTH CARE EDUCATION/TRAINING PROGRAM

## 2025-02-02 RX ORDER — MELOXICAM 7.5 MG/1
7.5 TABLET ORAL DAILY
Qty: 7 TABLET | Refills: 0 | Status: SHIPPED | OUTPATIENT
Start: 2025-02-02 | End: 2025-02-09

## 2025-02-02 NOTE — ED PROVIDER NOTES
EMERGENCY DEPARTMENT ENCOUNTER    Pt Name: Iza Mendieta  MRN: [id number]  Pt :   1995  Room Number:  22SF/22  Date of encounter:  2025  PCP: Provider, No Known  ED Provider: Aries Meyer PA-C    Historian: Patient, nursing notes      HPI:  Chief Complaint:  toe injury        Context: Iza Mendieta is a 29 y.o. female who presents to the ED c/o injury to her left fourth toe.  Patient states yesterday she accidentally impacted her foot on a piece of furniture at home causing an injury to the fourth toe.  She reports swelling and bruising to the fourth toe but denies any other injury.  Patient denies any pain to other areas of the foot other than the fourth toe and denies ankle pain knee pain hip pain or any other complaint today.  There was no head injury and no loss of consciousness.      PAST MEDICAL HISTORY  Past Medical History:   Diagnosis Date    Chlamydia     Urogenital trichomoniasis          PAST SURGICAL HISTORY  Past Surgical History:   Procedure Laterality Date    NO PAST SURGERIES      TUBAL COAGULATION LAPAROSCOPIC N/A 2023    Procedure: LAPAROSCOPIC TUBAL LIGATION;  Surgeon: Gabriel Hargrove MD;  Location: [facility];  Service: Obstetrics/Gynecology;  Laterality: N/A;    WISDOM TOOTH EXTRACTION           FAMILY HISTORY  History reviewed. No pertinent family history.      SOCIAL HISTORY  Social History     Socioeconomic History    Marital status: Single   Tobacco Use    Smoking status: Former     Current packs/day: 0.00     Average packs/day: 0.5 packs/day for 5.0 years (2.5 ttl pk-yrs)     Types: Cigarettes     Start date: 2018     Quit date: 2023     Years since quittin.8    Smokeless tobacco: Former   Vaping Use    Vaping status: Never Used   Substance and Sexual Activity    Alcohol use: Not Currently    Drug use: Not Currently     Types: Marijuana    Sexual activity: Not Currently     Partners: Male     Birth control/protection: Nexplanon      Comment: nexplanon still present since 2014         ALLERGIES  Amoxicillin and Penicillins        REVIEW OF SYSTEMS  Review of Systems   Constitutional:  Negative for chills and fever.   HENT:  Negative for congestion and sore throat.    Respiratory:  Negative for cough and shortness of breath.    Cardiovascular:  Negative for chest pain.   Gastrointestinal:  Negative for abdominal pain, nausea and vomiting.   Genitourinary:  Negative for dysuria.   Musculoskeletal:  Negative for back pain.        Left fourth toe injury   Skin:  Negative for wound.   Neurological:  Negative for dizziness and headaches.   Psychiatric/Behavioral:  Negative for confusion.    All other systems reviewed and are negative.         All systems reviewed and negative except for those discussed in HPI.       PHYSICAL EXAM    I have reviewed the triage vital signs and nursing notes.    ED Triage Vitals [02/02/25 1054]   Temp Heart Rate Resp BP SpO2   97.8 °F (36.6 °C) 55 14 112/74 100 %      Temp src Heart Rate Source Patient Position BP Location FiO2 (%)   Oral Monitor Sitting Left arm --       Physical Exam  Vitals and nursing note reviewed.   Constitutional:       General: She is not in acute distress.     Appearance: She is not ill-appearing, toxic-appearing or diaphoretic.   HENT:      Head: Normocephalic and atraumatic.      Mouth/Throat:      Mouth: Mucous membranes are moist.      Pharynx: Oropharynx is clear.   Eyes:      Extraocular Movements: Extraocular movements intact.   Cardiovascular:      Rate and Rhythm: Normal rate.      Heart sounds: Normal heart sounds.   Pulmonary:      Effort: Pulmonary effort is normal. No respiratory distress.      Breath sounds: Normal breath sounds.   Abdominal:      Tenderness: There is no abdominal tenderness.   Musculoskeletal:        Feet:    Feet:      Comments: No tenderness to palpation over remaining toes, distal or proximal foot, ankle or lower extremity.  DP and PT pulses easily palpable  with normal capillary refill and sensation intact  Skin:     General: Skin is warm and dry.      Findings: No rash.   Neurological:      Mental Status: She is alert.             LAB RESULTS  No results found for this or any previous visit (from the past 24 hours).    If labs were ordered, I independently reviewed the results and considered them in treating the patient.        RADIOLOGY  No Radiology Exams Resulted Within Past 24 Hours    I ordered and independently reviewed the above noted radiographic studies.      I viewed images of left toes x-ray which showed no acute fracture per my independent interpretation.    See radiologist's dictation for official interpretation.        PROCEDURES    Procedures    No orders to display       MEDICATIONS GIVEN IN ER    Medications - No data to display      MEDICAL DECISION MAKING, PROGRESS, and CONSULTS    All labs, if obtained, have been independently reviewed by me.  All radiology studies, if obtained, have been reviewed by me and the radiologist dictating the report.  All EKG's, if obtained, have been independently viewed and interpreted by me/my attending physician.      Discussion below represents my analysis of pertinent findings related to patient's condition, differential diagnosis, treatment plan and final disposition.    29-year-old female presenting for a stubbed toe on exam she has a ecchymotic and mildly swollen left fourth toe with no obvious subungual hematomas or nail damage, no tenderness over palpation to any other toe or the foot or ankle.  X-ray of toes was ordered and independently interpreted by me prior to radiology overread is having no fracture or obvious dislocation, I informed patient we will contact her if radiology overread differs from our findings today and blake taped personally the fourth and third digits together with advised to follow-up with podiatry should symptoms persist after 3 to 5 days of conservative management.  Patient verbalized  understanding of and agreement today's plan of care.                       Differential diagnosis:    Differential diagnosis included but was not limited to contusion of foot, toe dislocation, fracture      Additional sources:    - Discussed/ obtained information from independent historians: None    - External (non-ED) record review: Previous medical records reviewed    - Chronic or social conditions impacting care: None    Orders placed during this visit:  Orders Placed This Encounter   Procedures    XR Toe 2+ View Left         Additional orders considered but not ordered: None      ED Course:    Consultants: None    ED Course as of 02/02/25 1539   Sun Feb 02, 2025   1120 Patient declined pain medication [TG]      ED Course User Index  [TG] Aries Meyer PA-C              Shared Decision Making:  After my consideration of clinical presentation and any laboratory/radiology studies obtained, I discussed the findings with the patient/patient representative who is in agreement with the treatment plan and the final disposition.   Risks and benefits of discharge and/or observation/admission were discussed.       AS OF 15:39 EST VITALS:    BP - 112/74  HR - 55  TEMP - 97.8 °F (36.6 °C) (Oral)  O2 SATS - 100%                  DIAGNOSIS  Final diagnoses:   Contusion of lesser toe of left foot without damage to nail, initial encounter         DISPOSITION  Discharge      Please note that portions of this document were completed with voice recognition software.      Aries Meyer PA-C  02/02/25 1539

## 2025-02-02 NOTE — DISCHARGE INSTRUCTIONS
"Leave your toe \"blake tape\" over the next 5 to 7 days and if your toe pain symptoms do not resolve we recommend you follow-up with a podiatrist as soon as possible.  Return to the ER for any acute changes or worsening of your condition right away.  "

## 2025-02-05 PROBLEM — N93.9 ABNORMAL UTERINE BLEEDING (AUB): Status: ACTIVE | Noted: 2025-02-05

## 2025-02-05 NOTE — PROGRESS NOTES
Problem: Non-Pressure Injury Wound  Goal: # No deterioration in wound  Outcome: Outcome Not Met, Continue to Monitor  Goal: # Verbalizes understanding of wound and wound care  Description: If abnormality is a skin tear, avoid using tape on skin including transparent dressings. Document education using the patient education activity.  Outcome: Outcome Not Met, Continue to Monitor  Goal: Participates in wound care activities  Outcome: Outcome Not Met, Continue to Monitor      "GYN Office Visit    Subjective   Chief Complaint   Patient presents with    Menorrhagia     Painful/heavy periods, changing super plus tampons every 2 hours. Last pap 23 WNL     Iza Mendieta is a 29 y.o. year old  presenting heavy bleeding.    She reports heavy and painful menses since her tubal in 2023.  She is changing super plus tampons frequently.  She reports anemia on outside labs.    OB Hx:  x 2  Pap smear: NILM     Past Medical History:   Diagnosis Date    Chlamydia     Urogenital trichomoniasis        Past Surgical History:   Procedure Laterality Date    NO PAST SURGERIES      TUBAL COAGULATION LAPAROSCOPIC N/A 2023    Procedure: LAPAROSCOPIC TUBAL LIGATION;  Surgeon: Gabriel Hargrove MD;  Location: [facility];  Service: Obstetrics/Gynecology;  Laterality: N/A;    WISDOM TOOTH EXTRACTION         No family history on file.     Social History     Tobacco Use    Smoking status: Former     Current packs/day: 0.00     Average packs/day: 0.5 packs/day for 5.0 years (2.5 ttl pk-yrs)     Types: Cigarettes     Start date: 2018     Quit date: 2023     Years since quittin.8    Smokeless tobacco: Former   Vaping Use    Vaping status: Never Used   Substance Use Topics    Alcohol use: Not Currently    Drug use: Not Currently     Types: Marijuana       (Not in a hospital admission)      Amoxicillin and Penicillins    No current outpatient medications on file prior to visit.     No current facility-administered medications on file prior to visit.       Social History    Tobacco Use      Smoking status: Former        Packs/day: 0.00        Years: 0.5 packs/day for 5.0 years (2.5 ttl pk-yrs)        Types: Cigarettes        Start date: 2018        Quit date: 2023        Years since quittin.8      Smokeless tobacco: Former         Objective   /68   Ht 165.1 cm (65\")   Wt 52.6 kg (116 lb)   BMI 19.30 kg/m²     Physical Exam:  General " Appearance: alert, pleasant, appears stated age, interactive and cooperative         Medical Decision Making:    Assessment   Abnormal uterine bleeding  Menorrhagia with irregular cycle  Chronic blood loss anemia  Previous tubal ligation     Plan    No orders of the defined types were placed in this encounter.      Medication Management: Aygestin 5 mg daily    Procedures Performed: None    We reviewed her situation in detail today.  We discussed medical and surgical treatment options to address her bleeding symptoms.  Ultimately, she opted to try Aygestin which was prescribed today.  Instructions reviewed.  If her bleeding does not respond well to this medication, I would like her to return for a pelvic ultrasound and further discussion of treatment options.  Call/return precautions reviewed.  All questions answered.    RTC as needed.    Gabriel Hargrove MD  Obstetrics and Gynecology  [facility]

## 2025-03-25 ENCOUNTER — OFFICE VISIT (OUTPATIENT)
Dept: OBSTETRICS AND GYNECOLOGY | Facility: CLINIC | Age: 30
End: 2025-03-25
Payer: MEDICAID

## 2025-03-25 VITALS
DIASTOLIC BLOOD PRESSURE: 64 MMHG | BODY MASS INDEX: 20.43 KG/M2 | SYSTOLIC BLOOD PRESSURE: 104 MMHG | WEIGHT: 122.6 LBS | HEIGHT: 65 IN

## 2025-03-25 DIAGNOSIS — N85.8 DECIDUAL CAST: ICD-10-CM

## 2025-03-25 DIAGNOSIS — Z98.51 S/P TUBAL LIGATION: ICD-10-CM

## 2025-03-25 DIAGNOSIS — N93.9 ABNORMAL UTERINE BLEEDING (AUB): Primary | ICD-10-CM

## 2025-03-25 PROCEDURE — 99214 OFFICE O/P EST MOD 30 MIN: CPT | Performed by: OBSTETRICS & GYNECOLOGY

## 2025-03-25 RX ORDER — FLUCONAZOLE 150 MG/1
TABLET ORAL
Qty: 2 TABLET | Refills: 5 | Status: SHIPPED | OUTPATIENT
Start: 2025-03-25 | End: 2025-04-01

## 2025-03-25 RX ORDER — TRANEXAMIC ACID 650 MG/1
1300 TABLET ORAL 3 TIMES DAILY
Qty: 30 TABLET | Refills: 11 | Status: SHIPPED | OUTPATIENT
Start: 2025-03-25 | End: 2025-03-30

## 2025-04-01 ENCOUNTER — OFFICE VISIT (OUTPATIENT)
Dept: BEHAVIORAL HEALTH | Facility: CLINIC | Age: 30
End: 2025-04-01
Payer: MEDICAID

## 2025-04-01 VITALS
BODY MASS INDEX: 20.51 KG/M2 | SYSTOLIC BLOOD PRESSURE: 108 MMHG | HEIGHT: 65 IN | DIASTOLIC BLOOD PRESSURE: 72 MMHG | WEIGHT: 123.1 LBS

## 2025-04-01 DIAGNOSIS — F43.9 TRAUMA AND STRESSOR-RELATED DISORDER: ICD-10-CM

## 2025-04-01 DIAGNOSIS — F41.1 GAD (GENERALIZED ANXIETY DISORDER): Primary | ICD-10-CM

## 2025-04-01 PROCEDURE — 1159F MED LIST DOCD IN RCRD: CPT

## 2025-04-01 PROCEDURE — 90792 PSYCH DIAG EVAL W/MED SRVCS: CPT

## 2025-04-01 PROCEDURE — 1160F RVW MEDS BY RX/DR IN RCRD: CPT

## 2025-04-01 NOTE — PATIENT INSTRUCTIONS
[email or web address]: online directory of therapists    Juana recommendations:  Daniela and Hoopla: free library access, including audiobooks and e-books  I Am: affirmations  Balance: mindfulness and meditation  Rosa: mental health juana for kids and adults (user sets goals/tasks)  Parish: ADHD/mental health juana for parents and kids (parents set goals/tasks)  Mood Bloom: facilitated thought progression, helps with depression    Bilateral stimulation music: free on youtube and spotify    Book Recommendations:  How To Do The Work, Janet Parker (follow the Holistic Psychologist)*  Unf**k Your Brain, Aggie Gonzalez*  The Body Keeps The Score, Crista Van Geovani Borges  The Myth of Normal, Rashad Montes  Self Compassion, Mela Swann*  Rushing Woman's Syndrome, Daniela Vaca  The Emotionally Exhausted Woman, Kailey Sargent  Parenting a Child Who Has Intense Emotions, Sumaya

## 2025-04-01 NOTE — PROGRESS NOTES
GYN Office Visit    Subjective   Chief Complaint   Patient presents with    Follow-up     C/o AUB still, Aygestin started 25. Started her cycle  and stopped .     Iza Mendieta is a 29 y.o. year old  presenting for f/u heavy bleeding.    She reports ongoing issues with heavy and painful menses since her tubal in 2023.  Aygestin did not provide much improvement for her.  She had a very painful episode of bleeding with passage of tissue the other day.    OB Hx:  x 2  Contraception: BTL  Pap smear: NILM     Past Medical History:   Diagnosis Date    Chlamydia     Urogenital trichomoniasis        Past Surgical History:   Procedure Laterality Date    NO PAST SURGERIES      TUBAL COAGULATION LAPAROSCOPIC N/A 2023    Procedure: LAPAROSCOPIC TUBAL LIGATION;  Surgeon: Gabriel Hargrove MD;  Location: [facility];  Service: Obstetrics/Gynecology;  Laterality: N/A;    WISDOM TOOTH EXTRACTION         No family history on file.     Social History     Tobacco Use    Smoking status: Former     Current packs/day: 0.00     Average packs/day: 0.5 packs/day for 5.0 years (2.5 ttl pk-yrs)     Types: Cigarettes     Start date: 2018     Quit date: 2023     Years since quittin.9    Smokeless tobacco: Former   Vaping Use    Vaping status: Never Used   Substance Use Topics    Alcohol use: Not Currently    Drug use: Not Currently     Types: Marijuana       (Not in a hospital admission)      Amoxicillin and Penicillins    No current outpatient medications on file prior to visit.     No current facility-administered medications on file prior to visit.       Social History    Tobacco Use      Smoking status: Former        Packs/day: 0.00        Years: 0.5 packs/day for 5.0 years (2.5 ttl pk-yrs)        Types: Cigarettes        Start date: 2018        Quit date: 2023        Years since quittin.9      Smokeless tobacco: Former         Objective   /64   Ht  "165.1 cm (65\")   Wt 55.6 kg (122 lb 9.6 oz)   LMP 02/17/2025 (Approximate)   BMI 20.40 kg/m²     Physical Exam:  General Appearance: alert, pleasant, appears stated age, interactive and cooperative         Medical Decision Making:    I reviewed her ultrasound from today.    Ultrasound:  Normal-sized, anteverted uterus with no masses. Uterine length measures 8 cm. The endometrium measures 8.5 mm. Both ovaries are normal in appearance with normal follicles and vascularity. No free fluid in the cul-de-sac.     Assessment   Abnormal uterine bleeding  Menorrhagia with irregular cycle  Chronic blood loss anemia  Previous tubal ligation  Recent passage of decidual cast     Plan    Orders Placed This Encounter   Procedures    US Non-ob Transvaginal     Reason for Exam::   AUB     Release to patient:   Routine Release [[id number]]       Medication Management: Stop Aygestin 5 mg daily.  Start Lysteda 1,300 mg up to 3 times daily during the first 5 days of cycles    Procedures Performed: None    We reviewed her situation in detail today.  We discussed her ultrasound findings.  We discussed that she likely passed a decidual cast recently.  We discussed alternative options for her bleeding and pain symptoms.  Ultimately, she opted to try Lysteda which was ordered as outlined above.  Call/return precautions reviewed.  All questions answered.    RTC as needed.    Gabriel Hargrove MD  Obstetrics and Gynecology  [facility]   "
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
## 2025-05-06 NOTE — TELEPHONE ENCOUNTER
Abdomen , soft, nontender, nondistended , no guarding or rigidity , no masses palpable , normal bowel sounds , Liver and Spleen,  no hepatosplenomegaly , liver nontender Routed to Cherise

## 2025-05-13 ENCOUNTER — OFFICE VISIT (OUTPATIENT)
Dept: BEHAVIORAL HEALTH | Facility: CLINIC | Age: 30
End: 2025-05-13
Payer: MEDICAID

## 2025-05-13 VITALS
DIASTOLIC BLOOD PRESSURE: 66 MMHG | SYSTOLIC BLOOD PRESSURE: 108 MMHG | WEIGHT: 121.2 LBS | HEIGHT: 65 IN | BODY MASS INDEX: 20.19 KG/M2

## 2025-05-13 DIAGNOSIS — F43.9 TRAUMA AND STRESSOR-RELATED DISORDER: ICD-10-CM

## 2025-05-13 DIAGNOSIS — F41.1 GAD (GENERALIZED ANXIETY DISORDER): Primary | ICD-10-CM

## 2025-05-13 RX ORDER — FLUOXETINE HYDROCHLORIDE 40 MG/1
40 CAPSULE ORAL DAILY
Qty: 30 CAPSULE | Refills: 2 | Status: SHIPPED | OUTPATIENT
Start: 2025-05-13

## 2025-05-13 NOTE — PROGRESS NOTES
Follow Up Office Visit      Date: 2025   Patient Name: Iza Mendieta  : 1995   MRN: [id number]     Patient or patient representative verbalized consent for the use of Ambient Listening during the visit with  DON Church for chart documentation. 2025  09:44 EDT    Chief Complaint:  Iza Mendieta is a 29 y.o. female who is here today for follow up with medication management for anxiety.    History of Present Illness  She reports a noticeable improvement in her anxiety symptoms, attributing this to the efficacy of her current medication regimen. However, she continues to experience irritability and persistent fatigue. She recalls an instance where she missed a dose due to an oversight in setting her alarm on a non-working day. She also experienced transient gastrointestinal upset during the initial days of her treatment, which has since resolved. Despite these side effects, she expresses a willingness to increase her medication dosage slightly, as she believes it will not significantly alter her overall condition.    Interim History: She reports that her anxiety has improved with the current medication, but she still feels irritable and tired. She missed one dose but does not believe it had a significant impact. Initial gastrointestinal upset has resolved.    Social History:  - Works regularly, sets an alarm to take medication upon arriving at work  - Turns off alarms on non-working days to sleep in    Pertinent Negatives: She screens negative for ADHD.    Subjective     Review of Systems:   Review of Systems   Respiratory:  Negative for shortness of breath.    Cardiovascular:  Negative for chest pain and palpitations.   Gastrointestinal:  Negative for nausea.   Neurological:  Negative for dizziness and confusion.   Psychiatric/Behavioral:  Negative for depressed mood. The patient is nervous/anxious.        Screening Scores:   PHQ-9: 1 (last visit, 4)  JHOAN-7: 2  "(last visit, 6)    Medications:     Current Outpatient Medications:     FLUoxetine (PROzac) 40 MG capsule, Take 1 capsule by mouth Daily., Disp: 30 capsule, Rfl: 2    Allergies:   Allergies   Allergen Reactions    Amoxicillin Anaphylaxis    Penicillins Anaphylaxis       Results Reviewed: n/a     The following portion of the patient's history were reviewed and updated appropriately: allergies, current and past medications, family history, medical history and social history.    Objective     Vital Signs:   Vitals:    05/13/25 0937   BP: 108/66   Weight: 55 kg (121 lb 3.2 oz)   Height: 165.1 cm (65\")     Body mass index is 20.17 kg/m².     Mental Status Exam:   MENTAL STATUS EXAM   General Appearance:  Cleanly groomed and dressed  Eye Contact:  Good eye contact  Attitude:  Cooperative  Motor Activity:  Normal gait, posture  Muscle Strength:  Normal  Speech:  Normal rate, tone, volume  Language:  Spontaneous  Mood and affect:  Normal, pleasant and anxious  Hopelessness:  Denies  Loneliness: Denies  Thought Process:  Logical  Associations/ Thought Content:  No delusions  Hallucinations:  None  Suicidal Ideations:  Not present  Homicidal Ideation:  Not present  Sensorium:  Alert and clear  Orientation:  Person, place, time and situation  Immediate Recall, Recent, and Remote Memory:  Intact  Attention Span/ Concentration:  Good  Fund of Knowledge:  Appropriate for age and educational level  Intellectual Functioning:  Average range  Insight:  Good  Judgement:  Good  Reliability:  Good  Impulse Control:  Good        SUICIDE RISK ASSESSMENT/CSSRS:  1. Does patient have thoughts of suicide? no  2. Does patient have intent for suicide? no  3. Does patient have a current plan for suicide? no  4. History of suicide attempts: no  5. Family history of suicide or attempts: no  6. History of violent behaviors towards others or property or thoughts of committing suicide: no  7. History of sexual aggression toward others: no  8. Access " to firearms or weapons: no    Labs Reviewed: n/a  UDS Reviewed: n/a  Chart since last visit reviewed: yes    Assessment / Plan    Quality Measures:  Tobacco cessation: Patient is a former tobacco user. No tobacco cessation education necessary.     Depression (PHQ >9): Patient screened negative this visit with a PHQ score < 9. Will continue to monitor screening scores and provide supportive care.     Medication Considerations:  Benzo: n/a  Stimulants: n/a   SMITH reviewed and appropriate.     Risk Assessment: Risk of self-harm acutely is low. Risk of self-harm chronically is also low, but could be further elevated in the event of treatment noncompliance and/or AODA.    Impression/Formulation:  Patient appeared alert and oriented.  Patient is voluntarily seeking psychiatric care at [facility].  Patient is receptive to assistance with maintaining a stable lifestyle.  Conditions being treated include     ICD-10-CM ICD-9-CM   1. JHOAN (generalized anxiety disorder)  F41.1 300.02   2. Trauma and stressor-related disorder  F43.9 309.81     308.9   .     Visit Diagnosis/Orders Placed This Visit:  Diagnoses and all orders for this visit:    1. JHOAN (generalized anxiety disorder) (Primary)  -     FLUoxetine (PROzac) 40 MG capsule; Take 1 capsule by mouth Daily.  Dispense: 30 capsule; Refill: 2    2. Trauma and stressor-related disorder       Assessment & Plan  Content of Therapy:  During the session, the patient reported improvements in anxiety symptoms with Prozac but noted persistent irritability and fatigue. She mentioned missing one dose due to changes in her routine. The discussion included the long half-life of Prozac and its safety profile, including potential side effects such as gastrointestinal disturbances and changes in libido. The possibility of inattentive ADHD presenting as anxiety was also explored.    Clinical Impression:  The patient exhibits a positive response to Prozac with reduced  "anxiety but continues to experience irritability and fatigue. The missed dose does not appear to have significantly impacted her progress. She remains engaged and open to adjusting her treatment plan. There is no evidence of cognitive flattening, and she retains emotional responsiveness. The patient screened negative for ADHD, but the potential overlap with anxiety symptoms was discussed.    Therapeutic Intervention:  - Cognitive-behavioral strategies to reframe thoughts and manage irritability.  - Psychoeducation on the effects and side effects of Prozac.  - Exploration of potential ADHD symptoms and recommendation of educational resources.    Plan:  - Increase Prozac dosage to 40 mg daily.  - Advise doubling current 20 mg tablets until the new prescription is filled.  - Provide a 30-day supply of the increased dosage, with a 90-day supply to be considered later.  - Recommend listening to the podcast \"ADHD for Smart A** Women,\" to see if patient identifies any potential symptoms of ADHD that may be currently perceived as anxiety.  - Monitor for side effects and symptom improvement.    Follow-up:  - Next appointment scheduled for 06/2025 (6 weeks)  - Goals for the session: Evaluate response to increased Prozac dosage and discuss any ADHD-related insights.    Notes & Risk Factors:  - No current risk factors for harm to self or others.  - Protective factors include patient's engagement in treatment and willingness to adjust medication.    Any medications prescribed have been sent electronically to [facility] in [place].       Patient will continue supportive psychotherapy efforts and medications as indicated.  Discussed medication options and treatment plan of prescribed medication(s) as well as the risks, benefits, and potential side effects. Patient will contact this office, call 911 or present to the nearest emergency room should suicidal or homicidal ideations occur. Clinic will obtain release of information for " current treatment team for continuity of care as needed. Patient ackowledged and verbally consented to continue with current treatment plan and was educated on the importance of compliance with treatment and follow-up appointments.           DON Taveras  [facility]    This is electronically signed by DON Taveras  05/13/2025 09:21 EDT

## (undated) DEVICE — STRIP,CLOSURE,WOUND,MEDI-STRIP,1/2X4: Brand: MEDLINE

## (undated) DEVICE — MONOPOLAR METZENBAUM SCISSOR, MINI BLADE TIP, DISPOSABLE: Brand: MONOPOLAR METZENBAUM SCISSOR, MINI BLADE TIP, DISPOSABLE

## (undated) DEVICE — ENDOPATH XCEL BLADELESS TROCARS WITH STABILITY SLEEVES: Brand: ENDOPATH XCEL

## (undated) DEVICE — SLV SCD CALF HEMOFORCE DVT THERP REPROC MD

## (undated) DEVICE — SOL IRR H2O BTL 1000ML STRL

## (undated) DEVICE — ST TBG PNEUMOCLEAR EVAC SMOKE HIFLO

## (undated) DEVICE — PAD TRENDELENBURG W/RAIL STRAP

## (undated) DEVICE — RICH LAVH: Brand: MEDLINE INDUSTRIES, INC.

## (undated) DEVICE — ADHS LIQ MASTISOL 2/3ML

## (undated) DEVICE — SUT MNCRYL PLS ANTIB UD 3/0 PS2 27IN

## (undated) DEVICE — GLV SURG BIOGEL PI ULTRATOUCH G SZ7.5 LF

## (undated) DEVICE — GLV SURG SENSICARE PI LF PF 7.5 GRN STRL

## (undated) DEVICE — ENDOPATH XCEL UNIVERSAL TROCAR STABLILITY SLEEVES: Brand: ENDOPATH XCEL

## (undated) DEVICE — SPNG GZ WOVN 4X4IN 12PLY 10/BX STRL